# Patient Record
Sex: FEMALE | Race: WHITE | NOT HISPANIC OR LATINO | Employment: PART TIME | URBAN - METROPOLITAN AREA
[De-identification: names, ages, dates, MRNs, and addresses within clinical notes are randomized per-mention and may not be internally consistent; named-entity substitution may affect disease eponyms.]

---

## 2017-05-10 ENCOUNTER — ALLSCRIPTS OFFICE VISIT (OUTPATIENT)
Dept: OTHER | Facility: OTHER | Age: 22
End: 2017-05-10

## 2017-06-02 ENCOUNTER — ALLSCRIPTS OFFICE VISIT (OUTPATIENT)
Dept: OTHER | Facility: OTHER | Age: 22
End: 2017-06-02

## 2017-08-23 ENCOUNTER — ALLSCRIPTS OFFICE VISIT (OUTPATIENT)
Dept: OTHER | Facility: OTHER | Age: 22
End: 2017-08-23

## 2017-09-13 ENCOUNTER — GENERIC CONVERSION - ENCOUNTER (OUTPATIENT)
Dept: OTHER | Facility: OTHER | Age: 22
End: 2017-09-13

## 2017-10-19 ENCOUNTER — ALLSCRIPTS OFFICE VISIT (OUTPATIENT)
Dept: OTHER | Facility: OTHER | Age: 22
End: 2017-10-19

## 2017-10-19 DIAGNOSIS — S93.402A SPRAIN OF LIGAMENT OF LEFT ANKLE: ICD-10-CM
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
## 2017-10-20 NOTE — PROGRESS NOTES
Assessment  1  Sprain of left ankle, initial encounter (845 00) (S93 402A)   2  Injury caused by twisting with sudden strenuous movement, initial encounter (E927 3)   (X50 1XXA)    Plan  Injury caused by twisting with sudden strenuous movement, initial encounter, Sprain of  left ankle, initial encounter    · * XR ANKLE 3+ VIEW LEFT; Status:Active; Requested PJE:[id number];     Discussion/Summary    Discussed condition with patient  I suspect she has sustained an acute sprain of the left ankle  I recommended rest, elevation, ice, Tylenol/NSAIDs, and observation  I have provided her a prescription for an ankle x-ray should her condition not improve within the next few weeks and I will call results once obtained should she choose to go for this  The treatment plan was reviewed with the patient/guardian  The patient/guardian understands and agrees with the treatment plan      Chief Complaint  Pt c/o her L ankle being swollen and painful x 1 day  Pt states she works in [employer] and is on her feet a lot so her ankles get stiff in the morning but today was worse than usual       History of Present Illness  HPI: Patient presents with 1 day history of acute left medial foot and ankle pain  She believes she may have rolled her ankle and that that may very well be the cause of the pain  She denies any blunt force trauma  She has not really done anything for conservatively at this point including compression, ice, or any Tylenol/NSAIDs  She denies any other complaints today  Review of Systems    Constitutional: No fever, no chills, feels well, no tiredness, no recent weight gain or loss  Cardiovascular: no complaints of slow or fast heart rate, no chest pain, no palpitations, no leg claudication or lower extremity edema  Respiratory: no complaints of shortness of breath, no wheezing, no dyspnea on exertion, no orthopnea or PND     Gastrointestinal: no complaints of abdominal pain, no constipation, no nausea or diarrhea, no vomiting, no bloody stools  Genitourinary: no complaints of dysuria, no incontinence, no pelvic pain, no dysmenorrhea, no vaginal discharge or abnormal vaginal bleeding  Musculoskeletal: as noted in HPI  Active Problems  1  Asthma, mild intermittent (493 90) (J45 20)   2  Depression, unspecified depression type (311) (F32 9)   3  Dyshidrotic eczema (705 81) (L30 1)   4  Eczema (692 9) (L30 9)   5  Laceration of toe of right foot (893 0) (S91 119A)   6  Need for Tdap vaccination (V06 1) (Z23)   7  Paresthesia of right foot (782 0) (R20 2)   8  Positive depression screening (796 4) (Z13 89)   9  Screening for depression (V79 0) (Z13 89)   10  Walked into furniture, initial encounter (A553 6) (B35 03VX)    Past Medical History  1  History of Acute bacterial conjunctivitis of right eye (372 03) (H10 31)   2  History of Acute upper respiratory infection (465 9) (J06 9)   3  History of Cough (786 2) (R05)   4  History of Flu vaccine need (V04 81) (Z23)   5  History of acute bacterial sinusitis (V12 69) (Z87 09)   6  History of acute bronchitis (V12 69) (Z87 09)   7  History of acute sinusitis (V12 69) (Z87 09)   8  History of allergic rhinitis (V12 69) (Z87 09)   9  History of allergic rhinitis (V12 69) (Z87 09)   10  History of contact dermatitis (V13 3) (Z87 2)   11  History of fever (V13 89) (Z87 898)   12  History of otitis media (V12 49) (Z86 69)   13  History of Influenza A (487 1) (J10 1)   14  History of Puffy Eyelids (374 82)   15  History of Streptococcal sore throat (034 0) (J02 0)    Family History  Family History    1  Family history of Allergies   2  Family history of Eczema   3  Family history of Hypertension (V17 49)    Social History   · Denied: History of Alcohol Use (History)   · Current Some Day Smoker (305 1)   · Denied: History of Drug Use  The social history was reviewed and is unchanged  Current Meds   1   Eucrisa 2 % External Ointment; APPLY TO AFFECTED AREAS SPARINGLY TWICE   DAILY; Therapy: 26Yvm4808 to (Last Rx:13Sep2017) Ordered   2  Fluticasone Propionate 50 MCG/ACT Nasal Suspension; USE 2 SPRAYS IN EACH   NOSTRIL ONCE DAILY; Therapy: 82FZA0870 to (Last Dennis Parsons)  Requested for: 21Oct2016 Ordered   3  Montelukast Sodium 10 MG Oral Tablet; take 1 tablet by mouth daily as directed; Therapy: 40LNM2708 to (Evaluate:51Fms6629)  Requested for: 66Uhc9656; Last   Rx:21Ciz1626 Ordered   4  PredniSONE 10 MG Oral Tablet; 6 tabs PO daily x 2 days, then 5,5, 4, 4, 3, 3, 2, 2,   1, 1; Therapy: 28Bow9894 to (Evaluate:07Oct2017)  Requested for: 68Ylm5117; Last   Rx:89Kds5930 Ordered   5  ProAir  (90 Base) MCG/ACT Inhalation Aerosol Solution; INHALE 2 PUFFS   Every 6 hours PRN; Therapy: 70MZB2944 to (Last Rx:21Oct2016)  Requested for: 21Oct2016 Ordered   6  Triamcinolone Acetonide 0 1 % External Cream; APPLY 2-3 TIMES DAILY TO   AFFECTED AREA(S); Therapy: 77Dhs4012 to (Last Lazarus Merchant)  Requested for: 13Sep2017; Status:   ACTIVE - Renewal Voided Ordered    The medication list was reviewed and updated today  Allergies  1  No Known Drug Allergies    Vitals   Recorded: 41SPG8594 03:11PM   Temperature 97 7 F, Tympanic   Heart Rate 91   Respiration Quality Normal   Respiration 18   Systolic 551, LUE, Sitting   Diastolic 82, LUE, Sitting   Height 5 ft 6 5 in   Weight 174 lb 4 oz   BMI Calculated 27 7   BSA Calculated 1 9   O2 Saturation 98, RA   LMP 86Cfy0705   Pain Scale 6     Physical Exam    Constitutional   General appearance: No acute distress, well appearing and well nourished  Musculoskeletal   Inspection/palpation of joints, bones, and muscles: Abnormal  -- Tenderness to palpation and mild localized swelling over the left medial foot just below the malleolus and worsened with valgus stress on the ankle joint but range of motion otherwise intact without significant difficulty; no ecchymosis visualized  Neurologic   Sensation: No sensory loss      Psychiatric Orientation to person, place, and time: Normal     Mood and affect: Normal     Additional Exam:  Vital signs were reviewed  Signatures   Electronically signed by :  Mando Bush [facility]; Oct 19 2017  4:12PM EST                       (Author)    Electronically signed by : Girtha Hashimoto, DO; Oct 19 2017  5:30PM EST

## 2018-01-10 NOTE — MISCELLANEOUS
Message  Return to work or school:   Aditya Guzmán is under my professional care  She was seen in my office on 10/19/17       Out of Work 10/19/17  Marcy Hill PA-C        Signatures   Electronically signed by : Iman Curry, ; Oct 19 2017  3:39PM EST                       (Author)

## 2018-01-12 NOTE — PSYCH
History of Present Illness  Psychotherapy Provided [place]: Individual Psychotherapy 40 minutes provided today  Goals addressed in session:   Met with pt for an initial session  Discussed how pt has been struggling with anxiety and depression but that she is trying to work on getting herself on a "better track"  Pt states that she would like to develop some techniques that help her to better assess and respond to situations  Discussed the 5s question (will this affect me 5 days, 5 months, 5 years from now) to determine if it is something she can cope with or something she needs to react to  Pt will try this technique and will follow up with this worker to continue building on skills  HPI - Psych: Pt Is not on medication and is not sure that she wants to start any  Pt experiences anger, irritability, worrying, over analyzing, and difficulty letting things go  Pt works and goes to school full time  Pt wants to move up in the restaurant business but knows she needs to respond differently to others for that to happen  Pt has support in her family and friends  Pt was calm and cooperative throughout the session  Pt's mood and affect appeared to be within normal limits  Pt denies SI   Note   Note:   Encouraged the pt to try the 5s question and to follow up with this worker in a few weeks to continue to discuss her symptoms and skills  Assessment    1   Depression, unspecified depression type (311) (F32 9)    Signatures   Electronically signed by : Aleta Lopez LCSW; Jun 2 2017  2:44PM EST                       (Author)

## 2018-01-13 VITALS
OXYGEN SATURATION: 98 % | BODY MASS INDEX: 29.08 KG/M2 | WEIGHT: 170.31 LBS | HEIGHT: 64 IN | TEMPERATURE: 97.1 F | DIASTOLIC BLOOD PRESSURE: 80 MMHG | HEART RATE: 83 BPM | SYSTOLIC BLOOD PRESSURE: 110 MMHG | RESPIRATION RATE: 19 BRPM

## 2018-01-13 VITALS
OXYGEN SATURATION: 98 % | DIASTOLIC BLOOD PRESSURE: 80 MMHG | RESPIRATION RATE: 17 BRPM | BODY MASS INDEX: 28.24 KG/M2 | WEIGHT: 165.38 LBS | TEMPERATURE: 97.3 F | HEIGHT: 64 IN | SYSTOLIC BLOOD PRESSURE: 140 MMHG | HEART RATE: 92 BPM

## 2018-01-14 VITALS
SYSTOLIC BLOOD PRESSURE: 130 MMHG | DIASTOLIC BLOOD PRESSURE: 82 MMHG | TEMPERATURE: 97.7 F | HEART RATE: 91 BPM | RESPIRATION RATE: 18 BRPM | WEIGHT: 174.25 LBS | HEIGHT: 67 IN | BODY MASS INDEX: 27.35 KG/M2 | OXYGEN SATURATION: 98 %

## 2018-01-14 NOTE — PROGRESS NOTES
Assessment   1  Acute bacterial sinusitis (461 9) (J01 90)  2  Bronchospasm, acute (519 11) (J98 01)  3  Asthma, mild intermittent (493 90) (J45 20)  4  Allergic rhinitis (477 9) (J30 9)    Plan  Acute bacterial sinusitis, Allergic rhinitis, Asthma, mild intermittent, Cough    · 2 - Dale Hinton DO  (Otolaryngology) Physician Referral  Consult  Status: Active   Requested for: 20Jan2016  () Care Summary provided  : Yes    Discussion/Summary    Patient is here for asthma med check but also concerned about cold symptoms persisting for the past 3 weeks  She was seen at urgent care on 1/16/16 and diagnosed with acute bronchitis and otitis media and treated with cough med with codeine and Augmentin  Patient states she is feeling a little better but still with persistent cough and chest tightness with asthma exacerbation, ear pressure and nasal congestion/sinus pressure  I will have patient continue Augmentin course until completed, as she is only half way through, but will add a prednisone 10 day taper to help reduce inflammation and pressure in the chest and sinuses  She was given an albuterol nebulizer in the office today with slight improvement of wheezing and air exchange, but I do feel prednisone is necessary at this time  I will have her continue pro air as needed, continue Mucinex DM  Patient is concerned as she has seen an ENT in the past for frequent upper respiratory infections that exacerbate her asthma but states she never followed through  Referral given to new ENT  She denies weekly use of her rescue inhaler and asthma does not wake her up in the middle the night  She reports only needing her pro air when she gets upper respiratory infections and colds  Therefore, I don't feel she needs a daily maintenance inhaler  However, for allergies and asthma I do feel that she may benefit from trying a daily Singulair which I did prescribe for her today   I explained to her that this is for maintenance of her asthma and allergies and reduce exacerbations and hopefully reduce allergy triggered upper respiratory infections  Patient advised to complete recommendations for treatment and to call if any persistent issues  Otherwise, she will follow up with ENT as directed  Possible side effects of new medications were reviewed with the patient/guardian today  The treatment plan was reviewed with the patient/guardian  The patient/guardian understands and agrees with the treatment plan      Chief Complaint  Pt here for issues with asthma and would like a consult for an ENT  History of Present Illness  HPI: Patient is a 21y/o female here for asthma f/u  She states she continues having reoccurring cold sxs, getting worse before it gets better  She states it flares up her asthma  States used to see ENT and never followed up  Wants another referral  Pt states has been also dealing with cold sxs for about 3 weeks  Reports nasal congestion, ear pressure, sinus pressure, loose/harsh cough  Associated tightness in chest and SOB, cannot take a deep breath, causes her to cough  Using her rescue inhaler every 4-6 hours without benefit  No fevers  Was seen at urgent care 1/16/16 and dx with bronchitis and otitis media  Pt taking augmentin, rescue inhaler, mucinex, claritin  Pt states she generally only needs rescue inhaler when she has cold sxs, allergies and when weather changes  Does not generally wake her up at night  Review of Systems    Constitutional: as noted in HPI    ENT: as noted in HPI  Cardiovascular: no complaints of slow or fast heart rate, no chest pain, no palpitations, no leg claudication or lower extremity edema  Respiratory: as noted in HPI  Active Problems   1  Acute bacterial conjunctivitis of right eye (372 03) (H10 021)  2  Bronchospasm, acute (519 11) (J98 01)  3  Cough (786 2) (R05)  4  Influenza A (487 1) (J10 1)    Past Medical History   1   History of Acute upper respiratory infection (465 9) (J06 9)  2  History of acute sinusitis (V12 69) (Z87 09)  3  History of allergic rhinitis (V12 69) (Z87 09)  4  History of contact dermatitis (V13 3) (Z87 2)  5  History of eczema (V13 3) (Z87 2)  6  History of fever (V13 89) (Z87 898)  7  History of otitis media (V12 49) (Z86 69)  8  History of Puffy Eyelids (374 82)  9  History of Streptococcal sore throat (034 0) (J02 0)    Family History   1  Family history of Allergies  2  Family history of Eczema  3  Family history of Hypertension (V17 49)    Social History    · Denied: History of Alcohol Use (History)   · Current Some Day Smoker (305 1)   · Denied: History of Drug Use  The social history was reviewed and updated today  Current Meds  1  Fluticasone Propionate 50 MCG/ACT Nasal Suspension; USE 1 SPRAY IN EACH   NOSTRIL ONCE DAILY; Therapy: 41YLS6771 to (Evaluate:17Nov2014)  Requested for: 76DGR0545; Last   Rx:15Rde3604 Ordered  2  ProAir  (90 Base) MCG/ACT Inhalation Aerosol Solution; INHALE 2 PUFFS   Every 6 hours PRN; Therapy: 13ENQ4030 to (Last Rx:20Ujh7265)  Requested for: 84Zvq3210 Ordered  3  Tobramycin 0 3 % Ophthalmic Solution; INSTILL 1 DROP IN THE AFFECTED EYE(S)   EVERY 2 HOURS WHILE AWAKE FOR 2 DAYS, THEN 1 DROP 4 TIMES DAILY; Therapy: 25WIU3856 to (Last Rx:31Uel9975) Ordered    The medication list was reviewed and updated today  Allergies   1  No Known Drug Allergies    Physical Exam    Constitutional   General appearance: Abnormal   acutely ill, comfortable, within normal limits of ideal weight, appears tired and appearance reflects stated age  Eyes   Conjunctiva and lids: No swelling, erythema or discharge  Ears, Nose, Mouth, and Throat   External inspection of ears and nose: Normal     Otoscopic examination: Abnormal   B/L TM's dull and bulging, erythematous, clear effusions  Nasal mucosa, septum, and turbinates: Abnormal   There was a mucoid discharge from both nares   The bilateral nasal mucosa was boggy, edematous and red  B/L swelling and erythema  Oropharynx: Abnormal   The posterior pharynx was erythematous and excessive thicker PND, but did not have an exudate  There was 3+ enlargement, but no erythema, no swelling and no concretions of both tonsils no exudate  Pulmonary   Respiratory effort: Abnormal   Respiratory rate: normal  Assessment of respiratory effort revealed normal rhythm and effort  Respiratory Findings: wet cough and excessive wet coughing throughout appt  Auscultation of lungs: Abnormal   B/L posterior lung fields with moderate decreased BS throughout, coarse and exp  wheezing  Cardiovascular   Auscultation of heart: Normal rate and rhythm, normal S1 and S2, without murmurs  Skin   Skin and subcutaneous tissue: Abnormal          Procedure      Treatment #1 included Albuterol Sulfate 2 5 mg  After treatment #1, the patient noted symptomatic improvement  Air exchange was improved  Wheezes were heard diffusely                    Signatures   Electronically signed by : CRISTINA Cantrell; Jan 20 2016  4:27PM EST                       (Author)    Electronically signed by : Alee Tolbert DO; Jan 21 2016  9:10PM EST                       (Co-author)

## 2018-01-22 VITALS
DIASTOLIC BLOOD PRESSURE: 80 MMHG | HEIGHT: 66 IN | OXYGEN SATURATION: 98 % | RESPIRATION RATE: 22 BRPM | BODY MASS INDEX: 27.36 KG/M2 | HEART RATE: 72 BPM | WEIGHT: 170.25 LBS | SYSTOLIC BLOOD PRESSURE: 114 MMHG | TEMPERATURE: 97.6 F

## 2018-03-30 DIAGNOSIS — J30.9 ALLERGIC RHINITIS, UNSPECIFIED CHRONICITY, UNSPECIFIED SEASONALITY, UNSPECIFIED TRIGGER: Primary | ICD-10-CM

## 2018-03-30 DIAGNOSIS — J45.20 MILD INTERMITTENT ASTHMA WITHOUT COMPLICATION: ICD-10-CM

## 2018-03-30 PROBLEM — F32.A DEPRESSION: Status: ACTIVE | Noted: 2017-06-02

## 2018-03-30 PROBLEM — L30.1 DYSHIDROTIC ECZEMA: Status: ACTIVE | Noted: 2017-08-23

## 2018-03-30 RX ORDER — FLUTICASONE PROPIONATE 50 MCG
SPRAY, SUSPENSION (ML) NASAL
Qty: 1 BOTTLE | Refills: 0 | Status: SHIPPED | OUTPATIENT
Start: 2018-03-30 | End: 2019-09-12 | Stop reason: SDUPTHER

## 2018-09-11 ENCOUNTER — OFFICE VISIT (OUTPATIENT)
Dept: FAMILY MEDICINE CLINIC | Facility: CLINIC | Age: 23
End: 2018-09-11
Payer: COMMERCIAL

## 2018-09-11 VITALS
SYSTOLIC BLOOD PRESSURE: 106 MMHG | RESPIRATION RATE: 15 BRPM | HEART RATE: 90 BPM | DIASTOLIC BLOOD PRESSURE: 80 MMHG | WEIGHT: 174.3 LBS | OXYGEN SATURATION: 98 % | TEMPERATURE: 98.7 F | BODY MASS INDEX: 32.07 KG/M2 | HEIGHT: 62 IN

## 2018-09-11 DIAGNOSIS — J40 BRONCHITIS: Primary | ICD-10-CM

## 2018-09-11 DIAGNOSIS — J35.1 HYPERTROPHY OF TONSILS: ICD-10-CM

## 2018-09-11 DIAGNOSIS — J45.20 MILD INTERMITTENT ASTHMA WITHOUT COMPLICATION: ICD-10-CM

## 2018-09-11 PROCEDURE — 99214 OFFICE O/P EST MOD 30 MIN: CPT | Performed by: PHYSICIAN ASSISTANT

## 2018-09-11 RX ORDER — AZITHROMYCIN 250 MG/1
TABLET, FILM COATED ORAL
Qty: 6 TABLET | Refills: 0 | Status: SHIPPED | OUTPATIENT
Start: 2018-09-11 | End: 2018-09-16

## 2018-09-11 RX ORDER — MONTELUKAST SODIUM 10 MG/1
10 TABLET ORAL
Qty: 30 TABLET | Refills: 5 | Status: SHIPPED | OUTPATIENT
Start: 2018-09-11 | End: 2018-11-01 | Stop reason: SDUPTHER

## 2018-09-11 RX ORDER — ALBUTEROL SULFATE 90 UG/1
2 AEROSOL, METERED RESPIRATORY (INHALATION) EVERY 6 HOURS PRN
Qty: 8.5 G | Refills: 2 | Status: SHIPPED | OUTPATIENT
Start: 2018-09-11 | End: 2019-01-21 | Stop reason: SDUPTHER

## 2018-09-11 RX ORDER — TRIAMCINOLONE ACETONIDE 1 MG/G
CREAM TOPICAL
Refills: 3 | COMMUNITY
Start: 2018-06-21 | End: 2018-11-01 | Stop reason: SDUPTHER

## 2018-09-11 RX ORDER — MONTELUKAST SODIUM 10 MG/1
TABLET ORAL
Refills: 5 | COMMUNITY
Start: 2018-06-21 | End: 2018-09-11 | Stop reason: SDUPTHER

## 2018-09-11 NOTE — LETTER
September 11, 2018     Patient: Miller Tate   YOB: 1995   Date of Visit: 9/11/2018       To Whom it May Concern:    Miller Tate is under my professional care  She was seen in my office on 9/11/2018  She may return to work on 9/12/18  If you have any questions or concerns, please don't hesitate to call           Sincerely,          Harriet Green PA-C        CC: No Recipients

## 2018-11-01 ENCOUNTER — OFFICE VISIT (OUTPATIENT)
Dept: FAMILY MEDICINE CLINIC | Facility: CLINIC | Age: 23
End: 2018-11-01
Payer: COMMERCIAL

## 2018-11-01 VITALS
HEIGHT: 62 IN | TEMPERATURE: 99.1 F | HEART RATE: 91 BPM | DIASTOLIC BLOOD PRESSURE: 70 MMHG | SYSTOLIC BLOOD PRESSURE: 110 MMHG | RESPIRATION RATE: 16 BRPM | BODY MASS INDEX: 33.31 KG/M2 | WEIGHT: 181 LBS | OXYGEN SATURATION: 97 %

## 2018-11-01 DIAGNOSIS — J45.20 MILD INTERMITTENT ASTHMA WITHOUT COMPLICATION: ICD-10-CM

## 2018-11-01 DIAGNOSIS — L30.9 ECZEMA, UNSPECIFIED TYPE: ICD-10-CM

## 2018-11-01 DIAGNOSIS — L30.9 DERMATITIS: Primary | ICD-10-CM

## 2018-11-01 PROCEDURE — 3008F BODY MASS INDEX DOCD: CPT | Performed by: PHYSICIAN ASSISTANT

## 2018-11-01 PROCEDURE — 96372 THER/PROPH/DIAG INJ SC/IM: CPT | Performed by: PHYSICIAN ASSISTANT

## 2018-11-01 PROCEDURE — 99213 OFFICE O/P EST LOW 20 MIN: CPT | Performed by: PHYSICIAN ASSISTANT

## 2018-11-01 RX ORDER — PREDNISONE 10 MG/1
TABLET ORAL
Qty: 32 TABLET | Refills: 0 | Status: SHIPPED | OUTPATIENT
Start: 2018-11-01 | End: 2019-01-21

## 2018-11-01 RX ORDER — TRIAMCINOLONE ACETONIDE 40 MG/ML
60 INJECTION, SUSPENSION INTRA-ARTICULAR; INTRAMUSCULAR ONCE
Status: COMPLETED | OUTPATIENT
Start: 2018-11-01 | End: 2018-11-01

## 2018-11-01 RX ORDER — MONTELUKAST SODIUM 10 MG/1
10 TABLET ORAL
Qty: 30 TABLET | Refills: 6 | Status: SHIPPED | OUTPATIENT
Start: 2018-11-01 | End: 2019-01-21 | Stop reason: SDUPTHER

## 2018-11-01 RX ORDER — TRIAMCINOLONE ACETONIDE 1 MG/G
CREAM TOPICAL 2 TIMES DAILY
Qty: 80 G | Refills: 3 | Status: SHIPPED | OUTPATIENT
Start: 2018-11-01 | End: 2019-03-07 | Stop reason: SDUPTHER

## 2018-11-01 RX ADMIN — TRIAMCINOLONE ACETONIDE 60 MG: 40 INJECTION, SUSPENSION INTRA-ARTICULAR; INTRAMUSCULAR at 17:35

## 2018-11-01 NOTE — PROGRESS NOTES
Assessment/Plan:      Diagnoses and all orders for this visit:    Dermatitis  -     predniSONE 10 mg tablet; 6 tabs PO x 2 days, then 5 tabs PO x 2 days, 4 tab x 1, then 3,2,1  -     triamcinolone acetonide (KENALOG-40) 40 mg/mL injection 60 mg; Inject 1 5 mL (60 mg total) into a muscle once     Mild intermittent asthma without complication  -     montelukast (SINGULAIR) 10 mg tablet; Take 1 tablet (10 mg total) by mouth daily at bedtime    Eczema, unspecified type  -     triamcinolone (KENALOG) 0 1 % cream; Apply topically 2 (two) times a day  -     predniSONE 10 mg tablet; 6 tabs PO x 2 days, then 5 tabs PO x 2 days, 4 tab x 1, then 3,2,1  -     triamcinolone acetonide (KENALOG-40) 40 mg/mL injection 60 mg; Inject 1 5 mL (60 mg total) into a muscle once        Patient is a 78-year-old female presenting today for concern of rash on her face, upper chest and neck as well as her arms consistent with a nonspecific dermatitis but also suspecting an eczema flare  She recently traveled   To [place] but states that the rash was slightly present prior to traveling but has worsened since she has gotten home  Today we will administer 60 mg of Kenalog intramuscularly  She will start a prednisone taper as directed tomorrow morning taking it every morning with food with risks versus benefits and side effects discussed  Her asthma is well controlled with Singulair refilled today  Her topical triamcinolone steroid cream was also refilled that she can use on itchy areas avoiding her face  We will see how she does through the course of the next few days and she is to call or follow up with persistent symptoms  Chief Complaint   Patient presents with    Rash     neck,face,arms x 1 week       Subjective:     Patient ID: Cruzito Nguyen is a 24 y o  female  24y/o female here today for a week of rash mostly UE's chest, hands and face  Slightly itchy, eyes blood shot   Rash "hurts and uncomfortable" Took 2 benadryl without relief  She just got back from [place], started while traveling there but was fine and itchy slightly while there but worse past few days  Review of Systems   Constitutional: Negative  Respiratory: Negative  Skin:        As in HPI         The following portions of the patient's history were reviewed and updated as appropriate: allergies, current medications, past family history, past medical history, past social history, past surgical history and problem list       Objective:     Physical Exam   Constitutional: She appears well-developed and well-nourished  Skin:   Erythematous rash with scattered tiny papules within upper chest and neck, face, slight swelling of eyelids  B/L palms of hands and some fingers with blister type papules under skin consistent with  Dyshidrotic eczema  Also dryness and excoriation and erythema B/L antecubital regions of B/L arms  Psychiatric: She has a normal mood and affect  Vitals reviewed        Vitals:    11/01/18 1702   BP: 110/70   BP Location: Left arm   Patient Position: Sitting   Cuff Size: Adult   Pulse: 91   Resp: 16   Temp: 99 1 °F (37 3 °C)   TempSrc: Tympanic   SpO2: 97%   Weight: 82 1 kg (181 lb)   Height: 5' 2 21" (1 58 m)

## 2018-11-01 NOTE — LETTER
November 1, 2018     Patient: Chuck Elizondo   YOB: 1995   Date of Visit: 11/1/2018       To Whom it May Concern:    Chuck Elizondo is under my professional care  She was seen in my office on 11/1/2018  She may return to work on 11/2/18       If you have any questions or concerns, please don't hesitate to call           Sincerely,          Magalie Asencio PA-C        CC: No Recipients

## 2019-01-21 ENCOUNTER — OFFICE VISIT (OUTPATIENT)
Dept: FAMILY MEDICINE CLINIC | Facility: CLINIC | Age: 24
End: 2019-01-21
Payer: COMMERCIAL

## 2019-01-21 VITALS
RESPIRATION RATE: 17 BRPM | HEIGHT: 62 IN | SYSTOLIC BLOOD PRESSURE: 118 MMHG | HEART RATE: 87 BPM | TEMPERATURE: 99.2 F | BODY MASS INDEX: 35.48 KG/M2 | OXYGEN SATURATION: 96 % | WEIGHT: 192.8 LBS | DIASTOLIC BLOOD PRESSURE: 84 MMHG

## 2019-01-21 DIAGNOSIS — J45.21 MILD INTERMITTENT ASTHMA WITH ACUTE EXACERBATION: Primary | ICD-10-CM

## 2019-01-21 DIAGNOSIS — J01.90 ACUTE NON-RECURRENT SINUSITIS, UNSPECIFIED LOCATION: ICD-10-CM

## 2019-01-21 DIAGNOSIS — J45.20 MILD INTERMITTENT ASTHMA WITHOUT COMPLICATION: ICD-10-CM

## 2019-01-21 PROCEDURE — 99214 OFFICE O/P EST MOD 30 MIN: CPT | Performed by: PHYSICIAN ASSISTANT

## 2019-01-21 PROCEDURE — 94640 AIRWAY INHALATION TREATMENT: CPT | Performed by: PHYSICIAN ASSISTANT

## 2019-01-21 RX ORDER — ALBUTEROL SULFATE 90 UG/1
2 AEROSOL, METERED RESPIRATORY (INHALATION) EVERY 6 HOURS PRN
Qty: 8.5 G | Refills: 2 | Status: SHIPPED | OUTPATIENT
Start: 2019-01-21 | End: 2019-10-26 | Stop reason: SDUPTHER

## 2019-01-21 RX ORDER — PREDNISONE 10 MG/1
TABLET ORAL
Qty: 21 TABLET | Refills: 0 | Status: SHIPPED | OUTPATIENT
Start: 2019-01-21 | End: 2019-03-07 | Stop reason: SDUPTHER

## 2019-01-21 RX ORDER — ALBUTEROL SULFATE 2.5 MG/3ML
2.5 SOLUTION RESPIRATORY (INHALATION) ONCE
Status: COMPLETED | OUTPATIENT
Start: 2019-01-21 | End: 2019-01-21

## 2019-01-21 RX ORDER — AZITHROMYCIN 250 MG/1
TABLET, FILM COATED ORAL
Qty: 6 TABLET | Refills: 0 | Status: SHIPPED | OUTPATIENT
Start: 2019-01-21 | End: 2019-01-25

## 2019-01-21 RX ORDER — MONTELUKAST SODIUM 10 MG/1
10 TABLET ORAL
Qty: 30 TABLET | Refills: 5 | Status: SHIPPED | OUTPATIENT
Start: 2019-01-21 | End: 2019-03-07 | Stop reason: SDUPTHER

## 2019-01-21 RX ADMIN — ALBUTEROL SULFATE 2.5 MG: 2.5 SOLUTION RESPIRATORY (INHALATION) at 19:08

## 2019-01-21 NOTE — PROGRESS NOTES
Assessment/Plan:      Diagnoses and all orders for this visit:    Mild intermittent asthma with acute exacerbation  -     predniSONE 10 mg tablet; 6,5,4,3,2,1  Take with food  -     albuterol inhalation solution 2 5 mg; Take 3 mL (2 5 mg total) by nebulization once   -     Mini neb    Acute non-recurrent sinusitis, unspecified location  -     azithromycin (ZITHROMAX) 250 mg tablet; 2 tabs PO daily x 1 day, then 1 tab Po daily x 4 days  -     predniSONE 10 mg tablet; 6,5,4,3,2,1  Take with food    Mild intermittent asthma without complication  -     montelukast (SINGULAIR) 10 mg tablet; Take 1 tablet (10 mg total) by mouth daily at bedtime  -     albuterol (PROAIR HFA) 90 mcg/act inhaler; Inhale 2 puffs every 6 (six) hours as needed for wheezing or shortness of breath      34-year-old female presenting today for persistent respiratory symptoms consistent with sinusitis and exacerbation of her asthma  I will treat her with a course of azithromycin as well as a 6 day taper prednisone with potential side effects discussed  A nebulizer treatment was administered in the office today for symptoms with subjective improvement as well as some improvement coarse breath sounds and LEs and wheezing  She will continue her ProAir and her Singulair as directed both were refilled today  She can continue using Mucinex OTC also to help with symptoms  We will see how she responds over the next few days and was advised to call or follow up with any persistent or worsening symptoms  Rest and fluids were advised  Chief Complaint   Patient presents with    Cough     x3d    Chest Congestion     x3d       Subjective:     Patient ID: Everton Galdamez is a 24 y o  female  22y/o female here today for persistent sinus sxs x 2-3 weeks, as well as asthma acting up  Nasal congestion/pressure, dry cough, chest tightness, SOB, wheezing  Low grade temp  No sore throat or ear pain  Using rescue more frequently, gets temporary relief   Drinking tea and soup  She needs refill of her singulair  Tried dayquil and nyquil, mucinex  Review of Systems   Constitutional: Positive for fever  HENT: Positive for congestion  Negative for ear pain and sore throat  Respiratory: Positive for cough, chest tightness, shortness of breath and wheezing  Cardiovascular: Negative  Psychiatric/Behavioral: Negative  The following portions of the patient's history were reviewed and updated as appropriate: allergies, current medications, past family history, past medical history, past social history, past surgical history and problem list       Objective:     Physical Exam   Constitutional: She appears well-developed  No distress  Appearing fatigued, mildly ill, obesity BMI 35   HENT:   Head: Normocephalic  Right Ear: Tympanic membrane and ear canal normal    Left Ear: Tympanic membrane and ear canal normal    Nose: Mucosal edema present  No rhinorrhea  Mouth/Throat: Posterior oropharyngeal erythema (PND) present  No posterior oropharyngeal edema  Neck: Neck supple  Cardiovascular: Normal rate, regular rhythm and normal heart sounds  Vitals reviewed  Vitals:    01/21/19 1817   BP: 118/84   BP Location: Left arm   Patient Position: Sitting   Cuff Size: Standard   Pulse: 87   Resp: 17   Temp: 99 2 °F (37 3 °C)   TempSrc: Tympanic   SpO2: 96%   Weight: 87 5 kg (192 lb 12 8 oz)   Height: 5' 2 21" (1 58 m)     Mini neb  Performed by: Qiun Johns  Authorized by: Quin Johns     Number of treatments:  1  Treatment 1:   Pre-Procedure     Symptoms:  Wheezing, shortness of breath and cough    Lung Sounds: Insp and exp wheezing throughout, coarse BS    HR:  87    RR:  17    SP02:  96% on RA    Medication Administered:  Albuterol 2 5 mg  Post-Procedure     Post-treatment symptoms: verbal improvement with sxs  Lung sounds:  Exp wheezing   less coarse throughout    HR:  Not checked    RR:  Not checked    SP02:  Not checked

## 2019-03-07 ENCOUNTER — OFFICE VISIT (OUTPATIENT)
Dept: FAMILY MEDICINE CLINIC | Facility: CLINIC | Age: 24
End: 2019-03-07
Payer: COMMERCIAL

## 2019-03-07 VITALS
BODY MASS INDEX: 35.17 KG/M2 | DIASTOLIC BLOOD PRESSURE: 80 MMHG | WEIGHT: 198.5 LBS | HEART RATE: 97 BPM | HEIGHT: 63 IN | OXYGEN SATURATION: 98 % | TEMPERATURE: 98.7 F | SYSTOLIC BLOOD PRESSURE: 122 MMHG | RESPIRATION RATE: 16 BRPM

## 2019-03-07 DIAGNOSIS — J45.20 MILD INTERMITTENT ASTHMA WITHOUT COMPLICATION: ICD-10-CM

## 2019-03-07 DIAGNOSIS — L30.9 ECZEMA, UNSPECIFIED TYPE: ICD-10-CM

## 2019-03-07 PROCEDURE — 99213 OFFICE O/P EST LOW 20 MIN: CPT | Performed by: PHYSICIAN ASSISTANT

## 2019-03-07 PROCEDURE — 1036F TOBACCO NON-USER: CPT | Performed by: PHYSICIAN ASSISTANT

## 2019-03-07 PROCEDURE — 3008F BODY MASS INDEX DOCD: CPT | Performed by: PHYSICIAN ASSISTANT

## 2019-03-07 RX ORDER — TRIAMCINOLONE ACETONIDE 1 MG/G
CREAM TOPICAL 2 TIMES DAILY
Qty: 80 G | Refills: 3 | Status: SHIPPED | OUTPATIENT
Start: 2019-03-07 | End: 2020-01-17

## 2019-03-07 RX ORDER — MONTELUKAST SODIUM 10 MG/1
10 TABLET ORAL
Qty: 30 TABLET | Refills: 5 | Status: SHIPPED | OUTPATIENT
Start: 2019-03-07 | End: 2020-03-13

## 2019-03-07 RX ORDER — PREDNISONE 10 MG/1
TABLET ORAL
Qty: 32 TABLET | Refills: 0 | Status: SHIPPED | OUTPATIENT
Start: 2019-03-07 | End: 2020-01-21 | Stop reason: ALTCHOICE

## 2019-03-07 RX ORDER — PREDNISONE 10 MG/1
TABLET ORAL
Qty: 32 TABLET | Refills: 0 | Status: SHIPPED | OUTPATIENT
Start: 2019-03-07 | End: 2019-03-07 | Stop reason: SDUPTHER

## 2019-09-12 DIAGNOSIS — J30.9 ALLERGIC RHINITIS: ICD-10-CM

## 2019-09-12 RX ORDER — FLUTICASONE PROPIONATE 50 MCG
SPRAY, SUSPENSION (ML) NASAL
Qty: 16 ML | Refills: 0 | Status: SHIPPED | OUTPATIENT
Start: 2019-09-12 | End: 2019-10-26 | Stop reason: SDUPTHER

## 2019-10-26 DIAGNOSIS — J30.9 ALLERGIC RHINITIS: ICD-10-CM

## 2019-10-26 DIAGNOSIS — J45.20 MILD INTERMITTENT ASTHMA WITHOUT COMPLICATION: ICD-10-CM

## 2019-10-27 RX ORDER — FLUTICASONE PROPIONATE 50 MCG
SPRAY, SUSPENSION (ML) NASAL
Qty: 16 ML | Refills: 0 | Status: SHIPPED | OUTPATIENT
Start: 2019-10-27 | End: 2020-03-13

## 2019-10-29 RX ORDER — ALBUTEROL SULFATE 90 UG/1
AEROSOL, METERED RESPIRATORY (INHALATION)
Qty: 8.5 G | Refills: 0 | Status: SHIPPED | OUTPATIENT
Start: 2019-10-29 | End: 2020-01-17

## 2019-10-30 NOTE — TELEPHONE ENCOUNTER
S/w pt gave her detailed message  Pt stated she will call back to schedule once she looks at her schedule  Thank you!

## 2020-01-17 DIAGNOSIS — L30.9 ECZEMA, UNSPECIFIED TYPE: ICD-10-CM

## 2020-01-17 DIAGNOSIS — J45.20 MILD INTERMITTENT ASTHMA WITHOUT COMPLICATION: ICD-10-CM

## 2020-01-17 RX ORDER — TRIAMCINOLONE ACETONIDE 1 MG/G
CREAM TOPICAL
Qty: 80 G | Refills: 0 | Status: SHIPPED | OUTPATIENT
Start: 2020-01-17 | End: 2020-03-13

## 2020-01-17 RX ORDER — ALBUTEROL SULFATE 90 UG/1
AEROSOL, METERED RESPIRATORY (INHALATION)
Qty: 8.5 G | Refills: 0 | Status: SHIPPED | OUTPATIENT
Start: 2020-01-17 | End: 2020-03-13

## 2020-01-21 ENCOUNTER — OFFICE VISIT (OUTPATIENT)
Dept: FAMILY MEDICINE CLINIC | Facility: CLINIC | Age: 25
End: 2020-01-21
Payer: COMMERCIAL

## 2020-01-21 VITALS
HEART RATE: 100 BPM | OXYGEN SATURATION: 98 % | BODY MASS INDEX: 33.77 KG/M2 | WEIGHT: 190.6 LBS | RESPIRATION RATE: 16 BRPM | TEMPERATURE: 98.1 F | SYSTOLIC BLOOD PRESSURE: 112 MMHG | DIASTOLIC BLOOD PRESSURE: 68 MMHG | HEIGHT: 63 IN

## 2020-01-21 DIAGNOSIS — J45.21 MILD INTERMITTENT ASTHMA WITH ACUTE EXACERBATION: Primary | ICD-10-CM

## 2020-01-21 DIAGNOSIS — L30.1 DYSHIDROTIC ECZEMA: ICD-10-CM

## 2020-01-21 DIAGNOSIS — R10.9 ABDOMINAL CRAMPING: ICD-10-CM

## 2020-01-21 PROCEDURE — 99213 OFFICE O/P EST LOW 20 MIN: CPT | Performed by: FAMILY MEDICINE

## 2020-01-21 PROCEDURE — 3008F BODY MASS INDEX DOCD: CPT | Performed by: FAMILY MEDICINE

## 2020-01-21 PROCEDURE — 1036F TOBACCO NON-USER: CPT | Performed by: FAMILY MEDICINE

## 2020-01-21 NOTE — PROGRESS NOTES
Assessment/Plan:   1  Mild intermittent asthma with acute exacerbation  Patient's symptoms appear very well controlled today  She denies any recent exacerbations of her asthma  Will continue at this time with her current treatment of Singulair as well as her Ventolin p r n  2  Dyshidrotic eczema  Stable today  Patient states that her eczema exacerbation from last week has been subsiding  She has been using her triamcinolone  She may continue to uses p r n  For symptom relief  3  Abdominal cramping  Unclear as to the exact cause of her abdominal cramping  Reviewed the differential with her  Symptoms may likely be secondary to a dietary trigger her allergy such as celiac verses lactose intolerance  She was advised to trial dietary avoidance  If symptoms are not improving, will consider further evaluation with Gastroenterology  Follow up with patient in 6 months to 1 year  BMI Counseling: Body mass index is 34 31 kg/m²  The BMI is above normal  Nutrition recommendations include decreasing portion sizes, encouraging healthy choices of fruits and vegetables, decreasing fast food intake, consuming healthier snacks and limiting drinks that contain sugar  Exercise recommendations include moderate physical activity 150 minutes/week and exercising 3-5 times per week  No pharmacotherapy was ordered  Diagnoses and all orders for this visit:    Mild intermittent asthma with acute exacerbation    Dyshidrotic eczema          Subjective:       Chief Complaint   Patient presents with    Follow-up     med check       Patient ID: Simon Jc is a 21 y o  female  Patient is a 19-year-old female presents today for follow-up on chronic conditions  She has mild intermittent asthma as well as dyshidrotic eczema  She has been taking her medications regularly  She denies any recent exacerbations of her asthma  She states that last week she did have an exacerbation of her eczema    She has been using her triamcinolone cream which has been effective for her  Patient states that she has been having persistent problems with her abdominal cramping  She states that she believes that she may likely have an intolerance  She has tried a gluten free diet which has been mildly effective  Review of Systems   Constitutional: Negative for activity change, chills, fatigue and fever  HENT: Negative for congestion, ear pain, sinus pressure and sore throat  Eyes: Negative for redness, itching and visual disturbance  Respiratory: Negative for cough and shortness of breath  Cardiovascular: Negative for chest pain and palpitations  Gastrointestinal: Negative for abdominal pain, diarrhea and nausea  Endocrine: Negative for cold intolerance and heat intolerance  Genitourinary: Negative for dysuria, flank pain and frequency  Musculoskeletal: Negative for arthralgias, back pain, gait problem and myalgias  Skin: Negative for color change  Allergic/Immunologic: Negative for environmental allergies  Neurological: Negative for dizziness, numbness and headaches  Psychiatric/Behavioral: Negative for behavioral problems and sleep disturbance  The following portions of the patient's history were reviewed and updated as appropriate : past family history, past medical history, past social history and past surgical history      Current Outpatient Medications:     albuterol (PROVENTIL HFA,VENTOLIN HFA) 90 mcg/act inhaler, INHALE 2 PUFFS BY MOUTH EVERY 6 HOURS AS NEEDED FOR WHEEZING OR SHORTNESS OF BREATH, Disp: 8 5 g, Rfl: 0    fluticasone (FLONASE) 50 mcg/act nasal spray, SHAKE LIQUID AND USE 2 SPRAYS IN EACH NOSTRIL EVERY DAY, Disp: 16 mL, Rfl: 0    montelukast (SINGULAIR) 10 mg tablet, Take 1 tablet (10 mg total) by mouth daily at bedtime, Disp: 30 tablet, Rfl: 5    triamcinolone (KENALOG) 0 1 % cream, APPLY EXTERNALLY TO THE AFFECTED AREA TWICE DAILY, Disp: 80 g, Rfl: 0         Objective:         Vitals: 01/21/20 1136   BP: 112/68   BP Location: Right arm   Patient Position: Sitting   Cuff Size: Adult   Pulse: 100   Resp: 16   Temp: 98 1 °F (36 7 °C)   TempSrc: Tympanic   SpO2: 98%   Weight: 86 5 kg (190 lb 9 6 oz)   Height: 5' 2 5" (1 588 m)     Physical Exam   Constitutional: She is oriented to person, place, and time  She appears well-developed and well-nourished  HENT:   Head: Normocephalic and atraumatic  Nose: Nose normal    Mouth/Throat: No oropharyngeal exudate  Eyes: Pupils are equal, round, and reactive to light  Right eye exhibits no discharge  Left eye exhibits no discharge  Neck: Normal range of motion  Neck supple  No tracheal deviation present  Cardiovascular: Normal rate, regular rhythm and intact distal pulses  Exam reveals no gallop and no friction rub  No murmur heard  Pulses:       Dorsalis pedis pulses are 2+ on the right side, and 2+ on the left side  Posterior tibial pulses are 2+ on the right side, and 2+ on the left side  Pulmonary/Chest: Effort normal and breath sounds normal  No respiratory distress  She has no wheezes  She has no rales  Abdominal: Soft  Bowel sounds are normal  She exhibits no distension  There is no tenderness  There is no rebound and no guarding  Musculoskeletal: Normal range of motion  She exhibits no edema  Lymphadenopathy:        Head (right side): No submental and no submandibular adenopathy present  Head (left side): No submental and no submandibular adenopathy present  She has no cervical adenopathy  Right cervical: No superficial cervical, no deep cervical and no posterior cervical adenopathy present  Left cervical: No superficial cervical, no deep cervical and no posterior cervical adenopathy present  Neurological: She is alert and oriented to person, place, and time  No cranial nerve deficit or sensory deficit  Skin: Skin is warm, dry and intact     Psychiatric: Her speech is normal and behavior is normal  Judgment normal  Her mood appears not anxious  Cognition and memory are normal  She does not exhibit a depressed mood  Vitals reviewed

## 2020-03-13 DIAGNOSIS — J45.20 MILD INTERMITTENT ASTHMA WITHOUT COMPLICATION: ICD-10-CM

## 2020-03-13 DIAGNOSIS — L30.9 ECZEMA, UNSPECIFIED TYPE: ICD-10-CM

## 2020-03-13 DIAGNOSIS — J30.9 ALLERGIC RHINITIS: ICD-10-CM

## 2020-03-13 RX ORDER — TRIAMCINOLONE ACETONIDE 1 MG/G
CREAM TOPICAL
Qty: 80 G | Refills: 0 | Status: SHIPPED | OUTPATIENT
Start: 2020-03-13 | End: 2020-08-05 | Stop reason: SDUPTHER

## 2020-03-13 RX ORDER — MONTELUKAST SODIUM 10 MG/1
TABLET ORAL
Qty: 90 TABLET | Refills: 1 | Status: SHIPPED | OUTPATIENT
Start: 2020-03-13 | End: 2020-03-15

## 2020-03-13 RX ORDER — ALBUTEROL SULFATE 90 UG/1
AEROSOL, METERED RESPIRATORY (INHALATION)
Qty: 8.5 G | Refills: 0 | Status: SHIPPED | OUTPATIENT
Start: 2020-03-13 | End: 2020-04-21 | Stop reason: SDUPTHER

## 2020-03-13 RX ORDER — FLUTICASONE PROPIONATE 50 MCG
SPRAY, SUSPENSION (ML) NASAL
Qty: 16 G | Refills: 5 | Status: SHIPPED | OUTPATIENT
Start: 2020-03-13 | End: 2020-04-21 | Stop reason: SDUPTHER

## 2020-03-15 DIAGNOSIS — J45.20 MILD INTERMITTENT ASTHMA WITHOUT COMPLICATION: ICD-10-CM

## 2020-03-15 RX ORDER — MONTELUKAST SODIUM 10 MG/1
TABLET ORAL
Qty: 30 TABLET | Refills: 5 | Status: SHIPPED | OUTPATIENT
Start: 2020-03-15 | End: 2020-04-21 | Stop reason: SDUPTHER

## 2020-04-20 DIAGNOSIS — J30.9 ALLERGIC RHINITIS: ICD-10-CM

## 2020-04-20 DIAGNOSIS — J45.20 MILD INTERMITTENT ASTHMA WITHOUT COMPLICATION: ICD-10-CM

## 2020-04-20 RX ORDER — MONTELUKAST SODIUM 10 MG/1
10 TABLET ORAL
Qty: 30 TABLET | Refills: 0 | Status: CANCELLED | OUTPATIENT
Start: 2020-04-20

## 2020-04-20 RX ORDER — ALBUTEROL SULFATE 90 UG/1
AEROSOL, METERED RESPIRATORY (INHALATION)
Qty: 8.5 G | Refills: 0 | Status: CANCELLED | OUTPATIENT
Start: 2020-04-20

## 2020-04-20 RX ORDER — FLUTICASONE PROPIONATE 50 MCG
2 SPRAY, SUSPENSION (ML) NASAL DAILY
Qty: 16 G | Refills: 0 | Status: CANCELLED | OUTPATIENT
Start: 2020-04-20

## 2020-04-21 ENCOUNTER — TELEMEDICINE (OUTPATIENT)
Dept: FAMILY MEDICINE CLINIC | Facility: CLINIC | Age: 25
End: 2020-04-21
Payer: COMMERCIAL

## 2020-04-21 DIAGNOSIS — J45.20 MILD INTERMITTENT ASTHMA WITHOUT COMPLICATION: ICD-10-CM

## 2020-04-21 DIAGNOSIS — J30.9 ALLERGIC RHINITIS: ICD-10-CM

## 2020-04-21 PROCEDURE — 99213 OFFICE O/P EST LOW 20 MIN: CPT | Performed by: FAMILY MEDICINE

## 2020-04-21 RX ORDER — FLUTICASONE PROPIONATE 50 MCG
2 SPRAY, SUSPENSION (ML) NASAL DAILY
Qty: 16 G | Refills: 5 | Status: SHIPPED | OUTPATIENT
Start: 2020-04-21 | End: 2021-06-12 | Stop reason: SDUPTHER

## 2020-04-21 RX ORDER — MONTELUKAST SODIUM 10 MG/1
10 TABLET ORAL
Qty: 30 TABLET | Refills: 5 | Status: SHIPPED | OUTPATIENT
Start: 2020-04-21 | End: 2020-08-05 | Stop reason: SDUPTHER

## 2020-04-21 RX ORDER — ALBUTEROL SULFATE 90 UG/1
2 AEROSOL, METERED RESPIRATORY (INHALATION) EVERY 6 HOURS PRN
Qty: 1 INHALER | Refills: 5 | Status: SHIPPED | OUTPATIENT
Start: 2020-04-21 | End: 2020-08-05 | Stop reason: SDUPTHER

## 2020-04-21 RX ORDER — ALBUTEROL SULFATE 90 UG/1
AEROSOL, METERED RESPIRATORY (INHALATION)
Qty: 1 INHALER | Refills: 5 | OUTPATIENT
Start: 2020-04-21

## 2020-08-05 ENCOUNTER — TELEPHONE (OUTPATIENT)
Dept: FAMILY MEDICINE CLINIC | Facility: CLINIC | Age: 25
End: 2020-08-05

## 2020-08-05 DIAGNOSIS — L30.9 ECZEMA, UNSPECIFIED TYPE: ICD-10-CM

## 2020-08-05 DIAGNOSIS — J45.20 MILD INTERMITTENT ASTHMA WITHOUT COMPLICATION: ICD-10-CM

## 2020-08-05 NOTE — TELEPHONE ENCOUNTER
Pt called and needs refills on the following:    - triamcinolone (kenalog) 0 1% cream  Applies externally to the affected area twice  A day  Qty: 80 g    - montelukast (singulair) 10mg tablet  Takes 1 tablet by mouth daily at bedtime  Qty: 30 tablet    - albuterol (proventil hfa, ventolin hfa) 90 mcg/act inhaler  Inhale 2 puffs every 6 hours as needed for wheezing  Dose: 2 puff  Qty: 1 inhaler      Send to [facility] on [address]

## 2020-08-07 RX ORDER — ALBUTEROL SULFATE 90 UG/1
2 AEROSOL, METERED RESPIRATORY (INHALATION) EVERY 6 HOURS PRN
Qty: 1 INHALER | Refills: 5 | Status: SHIPPED | OUTPATIENT
Start: 2020-08-07 | End: 2020-09-03

## 2020-08-07 RX ORDER — MONTELUKAST SODIUM 10 MG/1
10 TABLET ORAL
Qty: 30 TABLET | Refills: 5 | Status: SHIPPED | OUTPATIENT
Start: 2020-08-07 | End: 2021-06-12 | Stop reason: SDUPTHER

## 2020-08-07 RX ORDER — TRIAMCINOLONE ACETONIDE 1 MG/G
CREAM TOPICAL 2 TIMES DAILY
Qty: 80 G | Refills: 0 | Status: SHIPPED | OUTPATIENT
Start: 2020-08-07 | End: 2020-09-21

## 2020-09-02 DIAGNOSIS — J45.20 MILD INTERMITTENT ASTHMA WITHOUT COMPLICATION: ICD-10-CM

## 2020-09-03 RX ORDER — ALBUTEROL SULFATE 90 UG/1
AEROSOL, METERED RESPIRATORY (INHALATION)
Qty: 25.5 G | Refills: 0 | Status: SHIPPED | OUTPATIENT
Start: 2020-09-03 | End: 2020-12-02

## 2020-09-21 DIAGNOSIS — L30.9 ECZEMA, UNSPECIFIED TYPE: ICD-10-CM

## 2020-09-21 RX ORDER — TRIAMCINOLONE ACETONIDE 1 MG/G
CREAM TOPICAL
Qty: 80 G | Refills: 0 | Status: SHIPPED | OUTPATIENT
Start: 2020-09-21 | End: 2020-11-17 | Stop reason: SDUPTHER

## 2020-10-16 ENCOUNTER — TELEPHONE (OUTPATIENT)
Dept: FAMILY MEDICINE CLINIC | Facility: CLINIC | Age: 25
End: 2020-10-16

## 2020-11-17 DIAGNOSIS — L30.9 ECZEMA, UNSPECIFIED TYPE: ICD-10-CM

## 2020-11-17 RX ORDER — TRIAMCINOLONE ACETONIDE 1 MG/G
CREAM TOPICAL 2 TIMES DAILY
Qty: 80 G | Refills: 0 | Status: SHIPPED | OUTPATIENT
Start: 2020-11-17 | End: 2020-12-15 | Stop reason: SDUPTHER

## 2020-12-02 DIAGNOSIS — J45.20 MILD INTERMITTENT ASTHMA WITHOUT COMPLICATION: ICD-10-CM

## 2020-12-02 RX ORDER — ALBUTEROL SULFATE 90 UG/1
AEROSOL, METERED RESPIRATORY (INHALATION)
Qty: 25.5 G | Refills: 0 | Status: SHIPPED | OUTPATIENT
Start: 2020-12-02 | End: 2021-06-12 | Stop reason: SDUPTHER

## 2020-12-15 DIAGNOSIS — J30.9 ALLERGIC RHINITIS: ICD-10-CM

## 2020-12-15 DIAGNOSIS — L30.9 ECZEMA, UNSPECIFIED TYPE: ICD-10-CM

## 2020-12-15 RX ORDER — FLUTICASONE PROPIONATE 50 MCG
2 SPRAY, SUSPENSION (ML) NASAL DAILY
Qty: 16 G | Refills: 5 | Status: CANCELLED | OUTPATIENT
Start: 2020-12-15

## 2020-12-16 RX ORDER — TRIAMCINOLONE ACETONIDE 1 MG/G
CREAM TOPICAL 2 TIMES DAILY
Qty: 28.4 G | Refills: 0 | Status: SHIPPED | OUTPATIENT
Start: 2020-12-16 | End: 2021-02-11 | Stop reason: SDUPTHER

## 2021-02-11 DIAGNOSIS — L30.9 ECZEMA, UNSPECIFIED TYPE: ICD-10-CM

## 2021-02-12 RX ORDER — TRIAMCINOLONE ACETONIDE 1 MG/G
CREAM TOPICAL 2 TIMES DAILY
Qty: 28.4 G | Refills: 0 | Status: SHIPPED | OUTPATIENT
Start: 2021-02-12

## 2021-04-06 DIAGNOSIS — J45.20 MILD INTERMITTENT ASTHMA WITHOUT COMPLICATION: ICD-10-CM

## 2021-04-06 RX ORDER — MONTELUKAST SODIUM 10 MG/1
TABLET ORAL
Qty: 90 TABLET | OUTPATIENT
Start: 2021-04-06

## 2021-06-12 DIAGNOSIS — J30.9 ALLERGIC RHINITIS: ICD-10-CM

## 2021-06-12 DIAGNOSIS — J45.20 MILD INTERMITTENT ASTHMA WITHOUT COMPLICATION: ICD-10-CM

## 2021-06-12 RX ORDER — FLUTICASONE PROPIONATE 50 MCG
2 SPRAY, SUSPENSION (ML) NASAL DAILY
Qty: 16 G | Refills: 0 | Status: SHIPPED | OUTPATIENT
Start: 2021-06-12

## 2021-06-12 RX ORDER — MONTELUKAST SODIUM 10 MG/1
10 TABLET ORAL
Qty: 30 TABLET | Refills: 0 | Status: SHIPPED | OUTPATIENT
Start: 2021-06-12

## 2021-06-12 RX ORDER — ALBUTEROL SULFATE 90 UG/1
2 AEROSOL, METERED RESPIRATORY (INHALATION) EVERY 6 HOURS PRN
Qty: 18 G | Refills: 0 | Status: SHIPPED | OUTPATIENT
Start: 2021-06-12 | End: 2021-07-05

## 2021-06-12 NOTE — TELEPHONE ENCOUNTER
Pt will be establishing with another provider due to her moving to [facility] and [employer]  I advised pt we will cover her for 30 days since it has already been over a year since she has been seen in our office and then she will have to get a new script from new PCP  Pt will call us to notify which PCP she will be transferring to so we can document update in chart

## 2021-07-04 DIAGNOSIS — J45.20 MILD INTERMITTENT ASTHMA WITHOUT COMPLICATION: ICD-10-CM

## 2021-07-04 NOTE — PROGRESS NOTES
Assessment/Plan:      Diagnoses and all orders for this visit:    Bronchitis    Mild intermittent asthma without complication  -     montelukast (SINGULAIR) 10 mg tablet; Take 1 tablet (10 mg total) by mouth daily at bedtime  -     albuterol (PROAIR HFA) 90 mcg/act inhaler; Inhale 2 puffs every 6 (six) hours as needed for wheezing or shortness of breath  -     azithromycin (ZITHROMAX) 250 mg tablet; Take 2 tabs PO x 1, then 1 tab PO daily x 4 days    Hypertrophy of tonsils  -     Ambulatory Referral to Otolaryngology; Future    Other orders  -     triamcinolone (KENALOG) 0 1 % cream; ROSSI EXT AA 2 TO 3 XD  -     Discontinue: montelukast (SINGULAIR) 10 mg tablet; TK 1 T PO D UTD  -     Discontinue: Crisaborole (EUCRISA) 2 % OINT; Apply topically        34-year-old female presenting today for concern of 2-3 days of lower respiratory symptoms consistent with bronchitis  She does have asthma as well and has been using her rescue inhaler a little more than usual   I will treat her with a course of azithromycin and recommended Mucinex DM OTC  She can also continue using her rescue inhaler as needed  She refused any steroids at this point today but if she is continuing with symptoms I may consider putting her on a steroid taper  Regarding her mild intermittent asthma, she has otherwise been well controlled and has been only needing the ProAir about once or twice a month  She will be continued on singular daily and both a rescue inhaler and Singulair were refilled today  She has had issues with enlargement of tonsils / hypertrophy in the past and has been discussed with her many times about considering seeing ENT but has never followed up  On exam she continues having chronic enlargement of the tonsils but is otherwise asymptomatic  She is requesting referral again for consultations so I did provide that to her  She should call or follow up with persistent or worsening symptoms      Chief Complaint   Patient presents with    Cold Like Symptoms     for about 3 days taking otc medications     Cough    Headache       Subjective:     Patient ID: Mollie Saint is a 24 y o  female  24y/o female here today for URI sxs past 2-3 days  Reports chest hurting, feeling hot/cold, coughing and chest tightness  Also bringing up a lot of mucous  Nasal congestion, headaches  No fevers  Tried tussin  Has been using proair more than usual, helping with tightness  She also has not seen ENT yet as referred last year for hypertrophy tonsils and large tonsils  She is still interested in seeing them  Asthma otherwise has been stable, maybe uses rescue 1-2 x a month  Review of Systems   Constitutional: Positive for chills and fatigue  Negative for fever  HENT: Positive for congestion  Negative for ear pain and sore throat  Respiratory: Positive for cough, chest tightness and wheezing  Cardiovascular: Negative  Neurological: Negative  Psychiatric/Behavioral: Negative  The following portions of the patient's history were reviewed and updated as appropriate: allergies, current medications, past family history, past medical history, past social history, past surgical history and problem list       Objective:     Physical Exam   Constitutional: She is oriented to person, place, and time  She appears well-developed  No distress  Appears mildly ill   HENT:   Head: Normocephalic  Right Ear: Tympanic membrane and ear canal normal    Left Ear: Tympanic membrane and ear canal normal    Nose: Nose normal    Mouth/Throat: Oropharynx is clear and moist    Tonsils 3+ B/L, chronic  Not red or swollen, no exudate   Neck: Neck supple  Cardiovascular: Normal rate and regular rhythm  Pulmonary/Chest: Effort normal  She has decreased breath sounds (mild throughout)  She has wheezes (low wheeze on expiration B/L upper and lower lung fields)  Neurological: She is alert and oriented to person, place, and time     Psychiatric: She has a normal mood and affect  Vitals reviewed        Vitals:    09/11/18 1450   BP: 106/80   BP Location: Left arm   Patient Position: Sitting   Cuff Size: Adult   Pulse: 90   Resp: 15   Temp: 98 7 °F (37 1 °C)   TempSrc: Tympanic   SpO2: 98%   Weight: 79 1 kg (174 lb 4 8 oz)   Height: 5' 2 21" (1 58 m) ,[email or web address],DirectAddress_[email or web address] ,[email or web address],DirectAddress_Unknown,[email or web address]

## 2021-07-05 RX ORDER — ALBUTEROL SULFATE 90 UG/1
AEROSOL, METERED RESPIRATORY (INHALATION)
Qty: 18 G | Refills: 2 | Status: SHIPPED | OUTPATIENT
Start: 2021-07-05

## 2024-02-05 ENCOUNTER — OFFICE VISIT (OUTPATIENT)
Dept: FAMILY MEDICINE CLINIC | Facility: CLINIC | Age: 29
End: 2024-02-05
Payer: COMMERCIAL

## 2024-02-05 VITALS
WEIGHT: 169.2 LBS | HEIGHT: 63 IN | SYSTOLIC BLOOD PRESSURE: 128 MMHG | BODY MASS INDEX: 29.98 KG/M2 | DIASTOLIC BLOOD PRESSURE: 86 MMHG | OXYGEN SATURATION: 98 % | TEMPERATURE: 98.3 F | HEART RATE: 84 BPM

## 2024-02-05 DIAGNOSIS — J45.20 MILD INTERMITTENT ASTHMA WITHOUT COMPLICATION: ICD-10-CM

## 2024-02-05 DIAGNOSIS — L73.9 FOLLICULITIS: ICD-10-CM

## 2024-02-05 DIAGNOSIS — Z13.220 SCREENING FOR LIPID DISORDERS: ICD-10-CM

## 2024-02-05 DIAGNOSIS — J01.90 ACUTE NON-RECURRENT SINUSITIS, UNSPECIFIED LOCATION: Primary | ICD-10-CM

## 2024-02-05 DIAGNOSIS — Z11.3 SCREEN FOR STD (SEXUALLY TRANSMITTED DISEASE): ICD-10-CM

## 2024-02-05 DIAGNOSIS — Z13.0 SCREENING FOR DEFICIENCY ANEMIA: ICD-10-CM

## 2024-02-05 DIAGNOSIS — J30.9 ALLERGIC RHINITIS: ICD-10-CM

## 2024-02-05 DIAGNOSIS — Z13.29 SCREENING FOR THYROID DISORDER: ICD-10-CM

## 2024-02-05 DIAGNOSIS — Z11.59 NEED FOR HEPATITIS C SCREENING TEST: ICD-10-CM

## 2024-02-05 DIAGNOSIS — Z13.1 SCREENING FOR DIABETES MELLITUS: ICD-10-CM

## 2024-02-05 DIAGNOSIS — Z11.4 SCREENING FOR HIV (HUMAN IMMUNODEFICIENCY VIRUS): ICD-10-CM

## 2024-02-05 DIAGNOSIS — M21.41 FLAT FEET: ICD-10-CM

## 2024-02-05 DIAGNOSIS — L30.9 ECZEMA, UNSPECIFIED TYPE: ICD-10-CM

## 2024-02-05 DIAGNOSIS — M21.42 FLAT FEET: ICD-10-CM

## 2024-02-05 PROCEDURE — 99204 OFFICE O/P NEW MOD 45 MIN: CPT

## 2024-02-05 RX ORDER — ALBUTEROL SULFATE 90 UG/1
2 AEROSOL, METERED RESPIRATORY (INHALATION) EVERY 6 HOURS PRN
Qty: 18 G | Refills: 2 | Status: SHIPPED | OUTPATIENT
Start: 2024-02-05

## 2024-02-05 RX ORDER — MONTELUKAST SODIUM 10 MG/1
10 TABLET ORAL
Qty: 30 TABLET | Refills: 3 | Status: SHIPPED | OUTPATIENT
Start: 2024-02-05

## 2024-02-05 RX ORDER — TRIAMCINOLONE ACETONIDE 1 MG/G
CREAM TOPICAL 2 TIMES DAILY
Qty: 45 G | Refills: 2 | Status: SHIPPED | OUTPATIENT
Start: 2024-02-05

## 2024-02-05 RX ORDER — FLUTICASONE PROPIONATE 50 MCG
2 SPRAY, SUSPENSION (ML) NASAL DAILY
Qty: 16 G | Refills: 0 | Status: SHIPPED | OUTPATIENT
Start: 2024-02-05

## 2024-02-05 RX ORDER — AMOXICILLIN AND CLAVULANATE POTASSIUM 875; 125 MG/1; MG/1
1 TABLET, FILM COATED ORAL EVERY 12 HOURS SCHEDULED
Qty: 14 TABLET | Refills: 0 | Status: SHIPPED | OUTPATIENT
Start: 2024-02-05 | End: 2024-02-12

## 2024-02-05 NOTE — ASSESSMENT & PLAN NOTE
Currently using albuterol PRN and Singulair at bedtime. She states she has been out of the Singulair for about 6 months and feels she needs to go back on it. Refill for both given today.

## 2024-02-05 NOTE — ASSESSMENT & PLAN NOTE
Patient has several spots on her bilateral ankles that appear to be pustular around the hair follicles. This is consistent with a folliculitis. Discussed with her that Augmentin will cover her for a folliculitis, and I am also giving her Bactroban ointment to apply once daily. Referral placed for dermatology for further evaluation and treatment. Discussed with patient she should avoid wearing high socks, as the moist environment is likely attributing to the pustules.

## 2024-02-05 NOTE — PATIENT INSTRUCTIONS
[facility] - [phone]    Start using Bactroban on the legs for the folliculitis once a day     Start taking Augmentin for the sinus infection    Restart other medications as discussed

## 2024-02-05 NOTE — ASSESSMENT & PLAN NOTE
Patient reports she was seeing a podiatrist for flat feet at her old clinic, and would like to become established with our podiatrists for continued evaluation and management. Referral placed today.

## 2024-02-05 NOTE — ASSESSMENT & PLAN NOTE
Patient with history of eczema which has been treated with Kenalog cream. She requested dermatology and allergy referral today, as she needs to become reestablished with them as well.

## 2024-02-05 NOTE — ASSESSMENT & PLAN NOTE
Patient with 2 week long history of sinus congestion, sore throat, ear fullness, and cough. Patient's exam today consistent with an acute sinus infection. Patient given Augmentin to cover for sinusitis and she should also begin using the Flonase daily. Also recommended sinus rinses and proper hydration. Patient is to follow up with us if symptoms do not improve after antibiotic.

## 2024-02-05 NOTE — PROGRESS NOTES
Name: Adrianna Becerra      : 1995      MRN: [id number]  Encounter Provider: Laura Sloan PA-C  Encounter Date: 2024   Encounter department: [facility]    Assessment & Plan     1. Acute non-recurrent sinusitis, unspecified location  Assessment & Plan:  Patient with 2 week long history of sinus congestion, sore throat, ear fullness, and cough. Patient's exam today consistent with an acute sinus infection. Patient given Augmentin to cover for sinusitis and she should also begin using the Flonase daily. Also recommended sinus rinses and proper hydration. Patient is to follow up with us if symptoms do not improve after antibiotic.     Orders:  -     amoxicillin-clavulanate (AUGMENTIN) 875-125 mg per tablet; Take 1 tablet by mouth every 12 (twelve) hours for 7 days    2. Folliculitis  Assessment & Plan:  Patient has several spots on her bilateral ankles that appear to be pustular around the hair follicles. This is consistent with a folliculitis. Discussed with her that Augmentin will cover her for a folliculitis, and I am also giving her Bactroban ointment to apply once daily. Referral placed for dermatology for further evaluation and treatment. Discussed with patient she should avoid wearing high socks, as the moist environment is likely attributing to the pustules.    Orders:  -     amoxicillin-clavulanate (AUGMENTIN) 875-125 mg per tablet; Take 1 tablet by mouth every 12 (twelve) hours for 7 days  -     mupirocin (BACTROBAN) 2 % ointment; Apply topically daily    3. Mild intermittent asthma without complication  Assessment & Plan:  Currently using albuterol PRN and Singulair at bedtime. She states she has been out of the Singulair for about 6 months and feels she needs to go back on it. Refill for both given today.     Orders:  -     albuterol (PROVENTIL HFA,VENTOLIN HFA) 90 mcg/act inhaler; Inhale 2 puffs every 6 (six) hours as needed for wheezing  -     Ambulatory Referral to  Allergy; Future  -     montelukast (SINGULAIR) 10 mg tablet; Take 1 tablet (10 mg total) by mouth daily at bedtime    4. Eczema, unspecified type  Assessment & Plan:  Patient with history of eczema which has been treated with Kenalog cream. She requested dermatology and allergy referral today, as she needs to become reestablished with them as well.     Orders:  -     Ambulatory Referral to Allergy; Future  -     Ambulatory Referral to Dermatology; Future  -     triamcinolone (KENALOG) 0.1 % cream; Apply topically 2 (two) times a day    5. Flat feet  Assessment & Plan:  Patient reports she was seeing a podiatrist for flat feet at her old clinic, and would like to become established with our podiatrists for continued evaluation and management. Referral placed today.    Orders:  -     Ambulatory Referral to Podiatry; Future    6. Need for hepatitis C screening test  -     Hepatitis C Antibody; Future; Expected date: 07/01/2024    7. Allergic rhinitis  -     fluticasone (FLONASE) 50 mcg/act nasal spray; 2 sprays into each nostril daily Shake before use  -     Ambulatory Referral to Allergy; Future    8. Screening for HIV (human immunodeficiency virus)  -     HIV 1/2 AG/AB w Reflex [facility] for 2 yr old and above; Future; Expected date: 07/01/2024    9. Screen for STD (sexually transmitted disease)  -     RPR-Syphilis Screening (Total Syphilis IGG/IGM); Future; Expected date: 07/01/2024  -     Chlamydia/GC amplified DNA by PCR; Future; Expected date: 07/01/2024    10. Screening for deficiency anemia  -     CBC and differential; Future; Expected date: 07/01/2024    11. Screening for diabetes mellitus  -     Comprehensive metabolic panel; Future; Expected date: 07/01/2024    12. Screening for lipid disorders  -     Lipid Panel with Direct LDL reflex; Future; Expected date: 07/01/2024    13. Screening for thyroid disorder  -     TSH, 3rd generation with Free T4 reflex; Future; Expected date: 07/01/2024         Patient will  follow up with me in 6 months with fasting labs prior.  Subjective      Patient presents today to establish care with our practice. Patient is in need of medication refills today. Patient is also concerned about two week long congestion. She states she has had sinus infections before and this feels similar. She states her throat is also sore. Her ears feel full as well. No fever or chills. She did not test herself for COVID. She has been taking OTC mucinex and allegra.     She is requesting referrals to allergy, dermatology, and podiatry.       Sinus Problem  This is a new problem. The current episode started 1 to 4 weeks ago. The problem is unchanged. There has been no fever. The pain is mild. Associated symptoms include congestion, coughing and a sore throat. Pertinent negatives include no chills, ear pain, headaches, shortness of breath, sinus pressure or swollen glands. Past treatments include oral decongestants. The treatment provided mild relief.     Review of Systems   Constitutional:  Negative for chills, fatigue and fever.   HENT:  Positive for congestion and sore throat. Negative for ear pain and sinus pressure.    Respiratory:  Positive for cough. Negative for chest tightness and shortness of breath.    Cardiovascular:  Negative for chest pain, palpitations and leg swelling.   Skin:  Positive for rash. Negative for color change.   Neurological:  Negative for dizziness, light-headedness and headaches.       Current Outpatient Medications on File Prior to Visit   Medication Sig    [DISCONTINUED] albuterol (PROVENTIL HFA,VENTOLIN HFA) 90 mcg/act inhaler TAKE 2 PUFFS BY MOUTH EVERY 6 HOURS AS NEEDED FOR WHEEZE (Patient not taking: Reported on 2/5/2024)    [DISCONTINUED] fluticasone (FLONASE) 50 mcg/act nasal spray 2 sprays into each nostril daily Shake before use (Patient not taking: Reported on 2/5/2024)    [DISCONTINUED] montelukast (SINGULAIR) 10 mg tablet Take 1 tablet (10 mg total) by mouth daily at  "bedtime (Patient not taking: Reported on 2/5/2024)    [DISCONTINUED] triamcinolone (KENALOG) 0.1 % cream Apply topically 2 (two) times a day (Patient not taking: Reported on 2/5/2024)       Objective     /86 (BP Location: Left arm, Patient Position: Sitting, Cuff Size: Large)   Pulse 84   Temp 98.3 °F (36.8 °C) (Temporal)   Ht 5' 2.5\" (1.588 m)   Wt 76.7 kg (169 lb 3.2 oz)   SpO2 98%   BMI 30.45 kg/m²     Physical Exam  Vitals and nursing note reviewed.   Constitutional:       General: She is not in acute distress.     Appearance: Normal appearance. She is not ill-appearing.   HENT:      Head: Normocephalic and atraumatic.      Right Ear: No middle ear effusion.      Left Ear:  No middle ear effusion.      Nose: Congestion present.      Mouth/Throat:      Mouth: Mucous membranes are moist.      Pharynx: Oropharynx is clear. Posterior oropharyngeal erythema present. No oropharyngeal exudate.      Tonsils: No tonsillar exudate or tonsillar abscesses. 3+ on the right. 3+ on the left.   Cardiovascular:      Rate and Rhythm: Normal rate and regular rhythm.      Heart sounds: No murmur heard.  Pulmonary:      Effort: Pulmonary effort is normal. No respiratory distress.      Breath sounds: No stridor. No wheezing, rhonchi or rales.   Skin:     General: Skin is warm and dry.      Coloration: Skin is not pale.      Findings: Rash present. Rash is pustular. Rash is not crusting, scaling, urticarial or vesicular.          Neurological:      General: No focal deficit present.      Mental Status: She is alert and oriented to person, place, and time.   Psychiatric:         Mood and Affect: Mood normal.         Behavior: Behavior normal.         Judgment: Judgment normal.       Laura Sloan PA-C    "

## 2024-02-21 PROBLEM — J01.90 ACUTE NON-RECURRENT SINUSITIS: Status: RESOLVED | Noted: 2024-02-05 | Resolved: 2024-02-21

## 2024-03-11 ENCOUNTER — TELEPHONE (OUTPATIENT)
Age: 29
End: 2024-03-11

## 2024-03-11 NOTE — TELEPHONE ENCOUNTER
Staff, Mj, called to report that the patient was admitted on 3/10/24 to their facility. This was an   FYI.

## 2024-03-14 ENCOUNTER — RA CDI HCC (OUTPATIENT)
Dept: OTHER | Facility: HOSPITAL | Age: 29
End: 2024-03-14

## 2024-03-19 ENCOUNTER — TELEPHONE (OUTPATIENT)
Age: 29
End: 2024-03-19

## 2024-03-19 ENCOUNTER — TELEPHONE (OUTPATIENT)
Dept: PSYCHIATRY | Facility: CLINIC | Age: 29
End: 2024-03-19

## 2024-03-19 ENCOUNTER — OFFICE VISIT (OUTPATIENT)
Dept: FAMILY MEDICINE CLINIC | Facility: CLINIC | Age: 29
End: 2024-03-19
Payer: COMMERCIAL

## 2024-03-19 VITALS
WEIGHT: 166 LBS | HEART RATE: 120 BPM | OXYGEN SATURATION: 100 % | SYSTOLIC BLOOD PRESSURE: 140 MMHG | TEMPERATURE: 99.1 F | DIASTOLIC BLOOD PRESSURE: 86 MMHG | BODY MASS INDEX: 29.88 KG/M2

## 2024-03-19 DIAGNOSIS — L30.9 ECZEMA, UNSPECIFIED TYPE: ICD-10-CM

## 2024-03-19 DIAGNOSIS — F41.9 ANXIETY: Primary | ICD-10-CM

## 2024-03-19 DIAGNOSIS — L73.9 FOLLICULITIS: ICD-10-CM

## 2024-03-19 DIAGNOSIS — H10.9 BACTERIAL CONJUNCTIVITIS: ICD-10-CM

## 2024-03-19 PROCEDURE — 99214 OFFICE O/P EST MOD 30 MIN: CPT

## 2024-03-19 RX ORDER — POLYMYXIN B SULFATE AND TRIMETHOPRIM 1; 10000 MG/ML; [USP'U]/ML
1 SOLUTION OPHTHALMIC EVERY 4 HOURS
Qty: 10 ML | Refills: 0 | Status: SHIPPED | OUTPATIENT
Start: 2024-03-19

## 2024-03-19 RX ORDER — HYDROXYZINE HYDROCHLORIDE 25 MG/1
25 TABLET, FILM COATED ORAL EVERY 6 HOURS PRN
Qty: 30 TABLET | Refills: 2 | Status: SHIPPED | OUTPATIENT
Start: 2024-03-19

## 2024-03-19 RX ORDER — TRIAMCINOLONE ACETONIDE 1 MG/G
CREAM TOPICAL 2 TIMES DAILY
Qty: 45 G | Refills: 2 | Status: SHIPPED | OUTPATIENT
Start: 2024-03-19

## 2024-03-19 NOTE — ASSESSMENT & PLAN NOTE
Patient presents today for follow-up from a recent stay in [facility] from 3/10 - 3/14.  Patient was taken originally to [facility] due to psychosis symptoms.  She received the proper workup in the ER and they ruled out any other medical cause to her symptoms.  She was taken to the psychiatric hospital and stayed for about 4 to 5 days.  She states she was not given a specific diagnosis in the hospital, and she felt like she could not trust the providers in the hospital, so she requested to leave as soon as she could.  Upon chart review, it looks like she was diagnosed with severe major depressive disorder with psychosis as well as a personality disorder, undetermined which specific personality disorder they diagnosed her with.  They did start her on Zoloft 50 mg, however she has not started it yet as she is concerned about the possible side effects.    Discussed with her today that I do think it is appropriate that she is put on Zoloft 50 mg.  She is not currently set up with a psychiatrist, so I will take over the medication for now until she can get in with psychiatry.  She is seeing therapy every single morning and is discussing her PTSD from her past relationships.  She states she will  the Zoloft today and start using it.  I also recommended using hydroxyzine as needed for anxiety attacks, which she is agreeable to take.  Patient has the proper resources set up for her mental health.  She currently denies any suicidal or homicidal ideations.  She is aware of what resources she can reach out to in case these thoughts develop.  Patient is agreeable to follow-up with me in 1 month to determine if the Zoloft 50 mg is working well for her.  Discussed with her we may need to increase to the max dose in the event that it is not working well for her.

## 2024-03-19 NOTE — PROGRESS NOTES
Name: Adrianna Becerra      : 1995      MRN: [id number]  Encounter Provider: Laura Sloan PA-C  Encounter Date: 3/19/2024   Encounter department: [facility]    Assessment & Plan     1. Anxiety  Assessment & Plan:  Patient presents today for follow-up from a recent stay in [facility] from 3/10 - 3/14.  Patient was taken originally to [facility] ER due to psychosis symptoms.  She received the proper workup in the ER and they ruled out any other medical cause to her symptoms.  She was taken to the psychiatric hospital and stayed for about 4 to 5 days.  She states she was not given a specific diagnosis in the hospital, and she felt like she could not trust the providers in the hospital, so she requested to leave as soon as she could.  Upon chart review, it looks like she was diagnosed with severe major depressive disorder with psychosis as well as a personality disorder, undetermined which specific personality disorder they diagnosed her with.  They did start her on Zoloft 50 mg, however she has not started it yet as she is concerned about the possible side effects.    Discussed with her today that I do think it is appropriate that she is put on Zoloft 50 mg.  She is not currently set up with a psychiatrist, so I will take over the medication for now until she can get in with psychiatry.  She is seeing therapy every single morning and is discussing her PTSD from her past relationships.  She states she will  the Zoloft today and start using it.  I also recommended using hydroxyzine as needed for anxiety attacks, which she is agreeable to take.  Patient has the proper resources set up for her mental health.  She currently denies any suicidal or homicidal ideations.  She is aware of what resources she can reach out to in case these thoughts develop.  Patient is agreeable to follow-up with me in 1 month to determine if the Zoloft 50 mg is working well for her.   Discussed with her we may need to increase to the max dose in the event that it is not working well for her.    Orders:  -     hydrOXYzine HCL (ATARAX) 25 mg tablet; Take 1 tablet (25 mg total) by mouth every 6 (six) hours as needed for anxiety  -     Ambulatory referral to Psych Services; Future    2. Bacterial conjunctivitis  Assessment & Plan:  Patient states her eyes have been swollen ever since being in [facility]. She is not sure if it is from the pillows or from the face wash they provided. She states she has been waking up with crusting on the eyelashes. She has been using an eye wash, which is slowly helping with her symptoms.  Eyes examined today which are swollen on both sides.  No crusting seen on exam today.  Likely there is an allergic component to her symptoms as well, as she is allergic to cat dander and is currently living in a house with a cat.  However, will treat her for bacterial conjunctivitis with Polytrim eyedrops and also counseled her she may use dry eye solution over-the-counter to help with her symptoms.  Patient agreeable with this plan.    Orders:  -     polymyxin b-trimethoprim (POLYTRIM) ophthalmic solution; Administer 1 drop to both eyes every 4 (four) hours    3. Folliculitis  -     mupirocin (BACTROBAN) 2 % ointment; Apply topically daily    4. Eczema, unspecified type  -     triamcinolone (KENALOG) 0.1 % cream; Apply topically 2 (two) times a day           Subjective      Patient presents today for follow-up from a hospital stay at [facility] from 3/10 - 3/14.  Patient was admitted to the hospital after an ER stay at [facility] due to anxiety.  While in the behavioral hospital, she was diagnosed with severe major depressive disorder with psychosis and a unspecified personality disorder.  She was started on Zoloft 50 mg, however she did not start taking it.  She is currently seeing a therapist every morning from 10-12 PM.  She is not  currently set up with a psychiatrist and is requesting a referral today.  She states she is still having anxiety attacks while she is talking through her PTSD with her therapist.  She denies any suicidal or homicidal ideations.    She states her eyes have also been swollen since being in the hospital.  She states sometimes they are crusty when she wakes up in the morning.  She has had no other drainage from the eyes.        Review of Systems   Constitutional:  Negative for chills, fatigue and fever.   Eyes:  Positive for pain and redness.   Respiratory:  Negative for cough, chest tightness and shortness of breath.    Cardiovascular:  Negative for chest pain, palpitations and leg swelling.   Neurological:  Negative for dizziness, light-headedness and headaches.   Psychiatric/Behavioral:  Positive for dysphoric mood and sleep disturbance. Negative for agitation, behavioral problems, confusion, hallucinations and suicidal ideas. The patient is nervous/anxious. The patient is not hyperactive.        Current Outpatient Medications on File Prior to Visit   Medication Sig    albuterol (PROVENTIL HFA,VENTOLIN HFA) 90 mcg/act inhaler Inhale 2 puffs every 6 (six) hours as needed for wheezing    fluticasone (FLONASE) 50 mcg/act nasal spray 2 sprays into each nostril daily Shake before use    montelukast (SINGULAIR) 10 mg tablet Take 1 tablet (10 mg total) by mouth daily at bedtime    sertraline (ZOLOFT) 50 mg tablet Take 50 mg by mouth daily    [DISCONTINUED] mupirocin (BACTROBAN) 2 % ointment Apply topically daily (Patient not taking: Reported on 3/19/2024)    [DISCONTINUED] triamcinolone (KENALOG) 0.1 % cream Apply topically 2 (two) times a day (Patient not taking: Reported on 3/19/2024)       Objective     /86 (BP Location: Left arm, Patient Position: Sitting, Cuff Size: Standard)   Pulse (!) 120   Temp 99.1 °F (37.3 °C) (Temporal)   Wt 75.3 kg (166 lb)   SpO2 100%   BMI 29.88 kg/m²     Physical Exam  Vitals and  nursing note reviewed.   Constitutional:       General: She is not in acute distress.     Appearance: Normal appearance. She is not ill-appearing or diaphoretic.   HENT:      Head: Normocephalic and atraumatic.   Cardiovascular:      Rate and Rhythm: Regular rhythm. Tachycardia present.      Heart sounds: No murmur heard.  Pulmonary:      Effort: Pulmonary effort is normal. No respiratory distress.      Breath sounds: Normal breath sounds.   Musculoskeletal:      Right lower leg: No edema.      Left lower leg: No edema.   Skin:     Coloration: Skin is not cyanotic or pale.   Neurological:      General: No focal deficit present.      Mental Status: She is alert and oriented to person, place, and time. Mental status is at baseline.   Psychiatric:         Mood and Affect: Mood normal.         Behavior: Behavior normal. Behavior is cooperative.         Judgment: Judgment normal.       Laura Sloan PA-C

## 2024-03-19 NOTE — ASSESSMENT & PLAN NOTE
Patient states her eyes have been swollen ever since being in [facility]. She is not sure if it is from the pillows or from the face wash they provided. She states she has been waking up with crusting on the eyelashes. She has been using an eye wash, which is slowly helping with her symptoms.  Eyes examined today which are swollen on both sides.  No crusting seen on exam today.  Likely there is an allergic component to her symptoms as well, as she is allergic to cat dander and is currently living in a house with a cat.  However, will treat her for bacterial conjunctivitis with Polytrim eyedrops and also counseled her she may use dry eye solution over-the-counter to help with her symptoms.  Patient agreeable with this plan.

## 2024-03-20 ENCOUNTER — TELEPHONE (OUTPATIENT)
Dept: PSYCHIATRY | Facility: CLINIC | Age: 29
End: 2024-03-20

## 2024-03-21 ENCOUNTER — TELEPHONE (OUTPATIENT)
Dept: PSYCHIATRY | Facility: CLINIC | Age: 29
End: 2024-03-21

## 2024-03-21 NOTE — TELEPHONE ENCOUNTER
Contacted PT. in regards to ASAP/STAT Referral, writer disclosed who was calling and pt hung up. Referral closed as is the 3rd attempt.

## 2024-03-28 DIAGNOSIS — J30.9 ALLERGIC RHINITIS: ICD-10-CM

## 2024-03-28 RX ORDER — FLUTICASONE PROPIONATE 50 MCG
SPRAY, SUSPENSION (ML) NASAL
Qty: 48 ML | Refills: 1 | Status: SHIPPED | OUTPATIENT
Start: 2024-03-28

## 2024-04-11 ENCOUNTER — TELEPHONE (OUTPATIENT)
Age: 29
End: 2024-04-11

## 2024-04-12 NOTE — TELEPHONE ENCOUNTER
Contacted patient in regards to ASAP Referral  in attempts to verify patient's needs of services and add patient to proper wait list. LVM for patient to contact intake dept  in regards to referral and possible scheduling.

## 2024-04-19 ENCOUNTER — OFFICE VISIT (OUTPATIENT)
Dept: FAMILY MEDICINE CLINIC | Facility: CLINIC | Age: 29
End: 2024-04-19
Payer: COMMERCIAL

## 2024-04-19 VITALS
HEIGHT: 63 IN | TEMPERATURE: 98.5 F | OXYGEN SATURATION: 99 % | BODY MASS INDEX: 30.16 KG/M2 | DIASTOLIC BLOOD PRESSURE: 64 MMHG | WEIGHT: 170.2 LBS | HEART RATE: 69 BPM | SYSTOLIC BLOOD PRESSURE: 108 MMHG

## 2024-04-19 DIAGNOSIS — H10.13 ALLERGIC CONJUNCTIVITIS OF BOTH EYES: ICD-10-CM

## 2024-04-19 DIAGNOSIS — L73.9 FOLLICULITIS: ICD-10-CM

## 2024-04-19 DIAGNOSIS — F41.9 ANXIETY: Primary | ICD-10-CM

## 2024-04-19 DIAGNOSIS — Z01.419 WELL WOMAN EXAM: ICD-10-CM

## 2024-04-19 PROBLEM — H10.9 BACTERIAL CONJUNCTIVITIS: Status: RESOLVED | Noted: 2024-03-19 | Resolved: 2024-04-19

## 2024-04-19 PROCEDURE — 99214 OFFICE O/P EST MOD 30 MIN: CPT

## 2024-04-19 NOTE — ASSESSMENT & PLAN NOTE
Patient's eye discharge improved with the Polytrim drops however she remains with excessive tearing and redness.  She does have prior history of dry eye syndrome however the has never been this bad before.  She believes it may be due to her allergies.  Recommended over-the-counter Pataday or dizziness drops for allergic conjunctivitis.  Recommended Systane for dry eye syndrome.  Also given referral for ophthalmology in the event that over-the-counter treatment would not work.  Patient agreeable with this plan.

## 2024-04-19 NOTE — ASSESSMENT & PLAN NOTE
Patient presents today for follow-up of her anxiety.  She has discontinued the Zoloft, as it was making her anxiety worse.  She is currently taking hydroxyzine about twice a day, sometimes more if she needs it.  She feels her anxiety is much better than last time we met.  She is continuing with the partial psychiatry program, she has therapy 4 times a week for 2 hours.  She feels her mood is still stabilizing.  She was not able to get in with psychiatry, phone number given today for her to call to get herself put on a wait list.  Patient is agreeable with staying on hydroxyzine, refills will be given when she requires them.  Discontinued Zoloft from her medication list.  She will follow-up with me sooner if needed.  No thoughts of suicide or homicide today.

## 2024-04-19 NOTE — PROGRESS NOTES
Name: Adrianna Becerra      : 1995      MRN: [id number]  Encounter Provider: Laura Sloan PA-C  Encounter Date: 2024   Encounter department: [facility]    Assessment & Plan     1. Anxiety  Assessment & Plan:  Patient presents today for follow-up of her anxiety.  She has discontinued the Zoloft, as it was making her anxiety worse.  She is currently taking hydroxyzine about twice a day, sometimes more if she needs it.  She feels her anxiety is much better than last time we met.  She is continuing with the partial psychiatry program, she has therapy 4 times a week for 2 hours.  She feels her mood is still stabilizing.  She was not able to get in with psychiatry, phone number given today for her to call to get herself put on a wait list.  Patient is agreeable with staying on hydroxyzine, refills will be given when she requires them.  Discontinued Zoloft from her medication list.  She will follow-up with me sooner if needed.  No thoughts of suicide or homicide today.      2. Allergic conjunctivitis of both eyes  Assessment & Plan:  Patient's eye discharge improved with the Polytrim drops however she remains with excessive tearing and redness.  She does have prior history of dry eye syndrome however the has never been this bad before.  She believes it may be due to her allergies.  Recommended over-the-counter Pataday or dizziness drops for allergic conjunctivitis.  Recommended Systane for dry eye syndrome.  Also given referral for ophthalmology in the event that over-the-counter treatment would not work.  Patient agreeable with this plan.    Orders:  -     Ambulatory Referral to Ophthalmology; Future    3. Well woman exam  -     Ambulatory Referral to Obstetrics / Gynecology; Future    4. Folliculitis  -     mupirocin (BACTROBAN) 2 % ointment; Apply topically daily         Follow-up in August for her annual physical, sooner with any acute concerns.  Subjective      Patient presents  today for follow-up of her anxiety.  She states she started taking the Zoloft for a few days after I saw her last, however it made anxiety worse so she discontinued it after a few days on her own.  She continues to take hydroxyzine usually about twice a day.  She is continuing with the partial psychiatry program, she sees a therapist 4 days a week for 2 hours in the morning.  She states she is continuing to work out her past traumas and at times it can be overwhelming for her, but she feels the anxiety as a whole is doing better.  She would like to remain on the hydroxyzine, but does not feel comfortable taking Zoloft continuously.    She also has been having a similar eye problem as last month.  She is having tearing of the eyes as well as redness.  She has been using the Polytrim drops without improvement of the watering eyes, did improve the discharge.  She believes it is coming from her allergies.      Review of Systems   Constitutional:  Negative for chills, fatigue and fever.   Eyes:  Positive for discharge and redness. Negative for photophobia, pain, itching and visual disturbance.   Respiratory:  Negative for cough, chest tightness and shortness of breath.    Cardiovascular:  Negative for chest pain, palpitations and leg swelling.   Neurological:  Negative for dizziness, light-headedness and headaches.   Psychiatric/Behavioral:  Positive for dysphoric mood (anxiety, improving). Negative for agitation, confusion, decreased concentration, self-injury, sleep disturbance and suicidal ideas. The patient is not nervous/anxious and is not hyperactive.        Current Outpatient Medications on File Prior to Visit   Medication Sig    albuterol (PROVENTIL HFA,VENTOLIN HFA) 90 mcg/act inhaler Inhale 2 puffs every 6 (six) hours as needed for wheezing    fluticasone (FLONASE) 50 mcg/act nasal spray 2 SPRAYS INTO EACH NOSTRIL DAILY ** SHAKE BEFORE USE**    hydrOXYzine HCL (ATARAX) 25 mg tablet Take 1 tablet (25 mg total)  "by mouth every 6 (six) hours as needed for anxiety    montelukast (SINGULAIR) 10 mg tablet Take 1 tablet (10 mg total) by mouth daily at bedtime    polymyxin b-trimethoprim (POLYTRIM) ophthalmic solution Administer 1 drop to both eyes every 4 (four) hours    triamcinolone (KENALOG) 0.1 % cream Apply topically 2 (two) times a day    [DISCONTINUED] mupirocin (BACTROBAN) 2 % ointment Apply topically daily    [DISCONTINUED] sertraline (ZOLOFT) 50 mg tablet Take 50 mg by mouth daily       Objective     /64 (BP Location: Left arm, Patient Position: Sitting, Cuff Size: Adult)   Pulse 69   Temp 98.5 °F (36.9 °C)   Ht 5' 2.5\" (1.588 m)   Wt 77.2 kg (170 lb 3.2 oz)   LMP 03/24/2024 (Exact Date)   SpO2 99%   BMI 30.63 kg/m²     Physical Exam  Vitals and nursing note reviewed.   Constitutional:       General: She is not in acute distress.     Appearance: Normal appearance. She is not ill-appearing or diaphoretic.   HENT:      Head: Normocephalic and atraumatic.   Eyes:      General: Lids are normal. No allergic shiner or visual field deficit.        Right eye: Discharge present. No foreign body.         Left eye: Discharge present.No foreign body.      Extraocular Movements: Extraocular movements intact.      Comments: Watery discharge from both eyes, appear to be red as well.    Pulmonary:      Effort: Pulmonary effort is normal. No respiratory distress.   Skin:     Coloration: Skin is not cyanotic or pale.   Neurological:      General: No focal deficit present.      Mental Status: She is alert and oriented to person, place, and time.   Psychiatric:         Mood and Affect: Mood normal.         Behavior: Behavior normal. Behavior is cooperative.         Judgment: Judgment normal.       Laura Sloan PA-C    "

## 2024-04-20 DIAGNOSIS — J45.20 MILD INTERMITTENT ASTHMA WITHOUT COMPLICATION: ICD-10-CM

## 2024-04-20 RX ORDER — MONTELUKAST SODIUM 10 MG/1
10 TABLET ORAL
Qty: 90 TABLET | Refills: 2 | Status: SHIPPED | OUTPATIENT
Start: 2024-04-20

## 2024-04-27 DIAGNOSIS — H10.9 BACTERIAL CONJUNCTIVITIS: ICD-10-CM

## 2024-04-29 RX ORDER — POLYMYXIN B SULFATE AND TRIMETHOPRIM 1; 10000 MG/ML; [USP'U]/ML
SOLUTION OPHTHALMIC
Qty: 10 ML | Refills: 0 | Status: SHIPPED | OUTPATIENT
Start: 2024-04-29

## 2024-04-30 ENCOUNTER — TELEPHONE (OUTPATIENT)
Dept: PSYCHIATRY | Facility: CLINIC | Age: 29
End: 2024-04-30

## 2024-05-15 ENCOUNTER — OFFICE VISIT (OUTPATIENT)
Dept: FAMILY MEDICINE CLINIC | Facility: CLINIC | Age: 29
End: 2024-05-15
Payer: COMMERCIAL

## 2024-05-15 VITALS
HEART RATE: 101 BPM | HEIGHT: 62 IN | TEMPERATURE: 98.8 F | DIASTOLIC BLOOD PRESSURE: 70 MMHG | OXYGEN SATURATION: 99 % | WEIGHT: 172 LBS | SYSTOLIC BLOOD PRESSURE: 110 MMHG | BODY MASS INDEX: 31.65 KG/M2

## 2024-05-15 DIAGNOSIS — L23.7 POISON IVY DERMATITIS: Primary | ICD-10-CM

## 2024-05-15 DIAGNOSIS — L30.9 ECZEMA, UNSPECIFIED TYPE: ICD-10-CM

## 2024-05-15 PROCEDURE — 99214 OFFICE O/P EST MOD 30 MIN: CPT

## 2024-05-15 RX ORDER — TRIAMCINOLONE ACETONIDE 1 MG/G
CREAM TOPICAL 2 TIMES DAILY
Qty: 45 G | Refills: 2 | Status: SHIPPED | OUTPATIENT
Start: 2024-05-15

## 2024-05-15 RX ORDER — METHYLPREDNISOLONE 4 MG/1
TABLET ORAL
Qty: 21 EACH | Refills: 0 | Status: SHIPPED | OUTPATIENT
Start: 2024-05-15

## 2024-05-15 NOTE — PROGRESS NOTES
"Ambulatory Visit  Name: Adrianna Becerra      : 1995      MRN: [id number]  Encounter Provider: Laura Sloan PA-C  Encounter Date: 5/15/2024   Encounter department: [facility]    Assessment & Plan   1. Poison ivy dermatitis  Assessment & Plan:  Patient with possible poison ivy on left and right forearm, suspect possible eczema flare as well. Will treat with Medrol dose pack to help with inflammation that is leading to her pruritus. Recommend Kenalog cream continuation. Strongly recommended dermatology evaluation, which patient is agreeable with. Recommend sooner follow up if rash does not improve.   Orders:  -     methylPREDNISolone 4 MG tablet therapy pack; Use as directed on package  2. Eczema, unspecified type  Assessment & Plan:  Rash present on exam today consistent with possible poison ivy dermatitis (recent exposure) vs eczema flare up. Recommend dermatology evaluation for eczema, given resources for dermatology. Recommend emollients on problem areas as well.   Orders:  -     triamcinolone (KENALOG) 0.1 % cream; Apply topically 2 (two) times a day  -     methylPREDNISolone 4 MG tablet therapy pack; Use as directed on package  -     Ambulatory Referral to Dermatology; Future       History of Present Illness     Patient presents for evaluation of rash that has been present for the past few days on her bilateral forearms. The rash is pruritic. She was exposed to poison ivy recently. She also believes it could be related to her eczema.         Review of Systems   Skin:  Positive for rash.     Pertinent Medical History   Eczema    Objective     /70 (BP Location: Left arm, Patient Position: Sitting, Cuff Size: Standard)   Pulse 101   Temp 98.8 °F (37.1 °C)   Ht 5' 2.21\" (1.58 m)   Wt 78 kg (172 lb)   LMP 2024 (Exact Date)   SpO2 99%   BMI 31.25 kg/m²     Physical Exam  Vitals and nursing note reviewed.   Constitutional:       General: She is not in acute distress.   "   Appearance: Normal appearance. She is not ill-appearing.   HENT:      Head: Normocephalic and atraumatic.   Pulmonary:      Effort: Pulmonary effort is normal. No respiratory distress.   Skin:     Coloration: Skin is not cyanotic or pale.             Comments: Red pruritic rash with small vesicles, consistent with poison ivy. Red eczematous rash on bilateral wrists, upper and lower back.    Neurological:      General: No focal deficit present.      Mental Status: She is alert and oriented to person, place, and time.   Psychiatric:         Mood and Affect: Mood normal.         Behavior: Behavior normal.         Judgment: Judgment normal.       Administrative Statements

## 2024-05-15 NOTE — ASSESSMENT & PLAN NOTE
Rash present on exam today consistent with possible poison ivy dermatitis (recent exposure) vs eczema flare up. Recommend dermatology evaluation for eczema, given resources for dermatology. Recommend emollients on problem areas as well.

## 2024-05-15 NOTE — ASSESSMENT & PLAN NOTE
Patient with possible poison ivy on left and right forearm, suspect possible eczema flare as well. Will treat with Medrol dose pack to help with inflammation that is leading to her pruritus. Recommend Kenalog cream continuation. Strongly recommended dermatology evaluation, which patient is agreeable with. Recommend sooner follow up if rash does not improve.

## 2024-05-19 PROBLEM — H10.13 ALLERGIC CONJUNCTIVITIS OF BOTH EYES: Status: RESOLVED | Noted: 2024-04-19 | Resolved: 2024-05-19

## 2024-05-22 ENCOUNTER — OFFICE VISIT (OUTPATIENT)
Dept: PODIATRY | Facility: CLINIC | Age: 29
End: 2024-05-22
Payer: COMMERCIAL

## 2024-05-22 DIAGNOSIS — M76.822 POSTERIOR TIBIALIS TENDINITIS OF BOTH LOWER EXTREMITIES: Primary | ICD-10-CM

## 2024-05-22 DIAGNOSIS — M21.41 FLAT FEET: ICD-10-CM

## 2024-05-22 DIAGNOSIS — M21.42 FLAT FEET: ICD-10-CM

## 2024-05-22 DIAGNOSIS — M76.821 POSTERIOR TIBIALIS TENDINITIS OF BOTH LOWER EXTREMITIES: Primary | ICD-10-CM

## 2024-05-22 PROCEDURE — 99202 OFFICE O/P NEW SF 15 MIN: CPT | Performed by: PODIATRIST

## 2024-05-22 RX ORDER — MELOXICAM 15 MG/1
15 TABLET ORAL DAILY
Qty: 30 TABLET | Refills: 1 | Status: SHIPPED | OUTPATIENT
Start: 2024-05-22 | End: 2024-07-21

## 2024-05-22 NOTE — PROGRESS NOTES
Ambulatory Visit  Name: Adrianna Becerra      : 1995      MRN: [id number]  Encounter Provider: Dieter Ralph DPM  Encounter Date: 2024   Encounter department: [facility]    Explained to patient that she is dealing with a bilateral flatfoot deformity along with hyperpronation that is leading to her bilateral foot pain.  Pain along the posterior tibial tendon is present both feet consistent with posterior tibial tendinitis.  The patient was referred to [facility] physical therapy for functional orthotics to control the pronation.  In the interim, she was placed on meloxicam 15 mg daily for 30 days with 1 refill.    Assessment & Plan   1. Posterior tibialis tendinitis of both lower extremities  -     meloxicam (Mobic) 15 mg tablet; Take 1 tablet (15 mg total) by mouth daily  -     Ambulatory referral to Physical Therapy; Future  2. Flat feet  -     Ambulatory Referral to Podiatry  -     Ambulatory referral to Physical Therapy; Future      History of Present Illness     Adrianna Becerra is a 29 y.o. female who presents with a bilateral flatfoot deformity exacerbated by hyperpronation.  The patient notes that she is employed as a .  After an extended workday she has pain along the inner aspect of each foot.  She has been using CBD oil due to discomfort.  She is inquiring about custom orthotics.    Review of Systems   Cardiovascular: Negative.    Gastrointestinal: Negative.    Musculoskeletal:  Positive for gait problem.   Psychiatric/Behavioral: Negative.             Objective     LMP 2024 (Exact Date)     Physical Exam  Constitutional:       Appearance: Normal appearance.   Cardiovascular:      Pulses: Normal pulses.   Musculoskeletal:         General: Deformity present.      Comments: Pes planus foot type bilateral with hyperpronation.  Good range of motion at the subtalar joint.  Mild discomfort with palpation along posterior tibial tendon bilateral.   Skin:     General: Skin is  warm.   Neurological:      General: No focal deficit present.      Mental Status: She is oriented to person, place, and time.           Administrative Statements

## 2024-05-23 DIAGNOSIS — L73.9 FOLLICULITIS: ICD-10-CM

## 2024-05-23 DIAGNOSIS — L30.9 ECZEMA, UNSPECIFIED TYPE: ICD-10-CM

## 2024-05-24 RX ORDER — TRIAMCINOLONE ACETONIDE 1 MG/G
CREAM TOPICAL 2 TIMES DAILY
Qty: 45 G | Refills: 0 | OUTPATIENT
Start: 2024-05-24

## 2024-06-05 ENCOUNTER — EVALUATION (OUTPATIENT)
Dept: PHYSICAL THERAPY | Facility: OTHER | Age: 29
End: 2024-06-05
Payer: COMMERCIAL

## 2024-06-05 DIAGNOSIS — M76.822 POSTERIOR TIBIALIS TENDINITIS OF BOTH LOWER EXTREMITIES: ICD-10-CM

## 2024-06-05 DIAGNOSIS — M21.42 FLAT FEET: ICD-10-CM

## 2024-06-05 DIAGNOSIS — M21.41 FLAT FEET: ICD-10-CM

## 2024-06-05 DIAGNOSIS — M76.821 POSTERIOR TIBIALIS TENDINITIS OF BOTH LOWER EXTREMITIES: ICD-10-CM

## 2024-06-05 PROCEDURE — 97760 ORTHOTIC MGMT&TRAING 1ST ENC: CPT

## 2024-06-05 NOTE — PROGRESS NOTES
"Orthotic Evaluation    Today's date: 24  Patient name: Adrianna Becerra  : 1995  MRN: [id number]  Referring provider: Dieter Ralph DPM  Dx:   Encounter Diagnosis     ICD-10-CM    1. Flat feet  M21.41 Ambulatory referral to Physical Therapy    M21.42       2. Posterior tibialis tendinitis of both lower extremities  M76.821 Ambulatory referral to Physical Therapy    M76.822           Start Time: 1039  Stop Time: 1123  Total time in clinic (min): 44 minutes     Subjective:    The following subjective from encounter with referring provider on 24 and confirmed with patient: \"Adrianna Becerra is a 29 y.o. female who presents with a bilateral flatfoot deformity exacerbated by hyperpronation.  The patient notes that she is employed as a .  After an extended workday she has pain along the inner aspect of each foot.  She has been using CBD oil due to discomfort.  She is inquiring about custom orthotics.\"    Presents today with the following: Adrianna presents today for orthotic evaluation. she complains of pain, balance dysfunction, ambulatory dysfunction, and postural dysfunction with functional activities including ambulation, athletics, and work. The patient plans to use her orthotic for walking, standing, and work. The orthotic will be placed in a standard, size 8.    Objective:    Age: 29 y.o.  Weight: 160    Foot Posture Index Score    Right Foot  Talar dome - +1  Malleolar curve - +2   Calcaneal eversion - +1  Talonavicular bulge - +1  Medial longitudinal arch - +2  Too many toes - +1  Total Score R = 8    Left Foot  Talar dome - +1  Malleolar curve - +2   Calcaneal eversion - +2  Talonavicular bulge - +1  Medial longitudinal arch - +2  Too many toes - +2  Total Score L = 10    Reference Values  Normal =    0 to +5  Pronated =  +6 to +9 Highly Pronated =  10+  Supinated =   -1 to -4 Highly Supinated = -5 to -12    Objective    Good mobility foot / ankle  TTP posterior tib tendon distally into " navicular  (+) Accessory navicular bilat  (+) Gastroc / soleus tightness  Gait reveals early excessive pronation, medial heel whip, decreased dorsiflexion with early heel rise and B foot ER    Assessment:    Patient requires custom foot orthosis with medial forefoot / rearfoot posting, medial clip, deepened heel cup, and poron back-filled navicular to correct altered gait mechanics / foot pain. Patient is not currently controlled by her current shoe wear. Patient was educated on the design of the custom orthotic and it's ability to offload her current pathology. Patient was also educated on potential shoe wear changes with device, break-in period, and skin checks to avoid potential skin break down.     Orthotic goals:  1) Patient will have custom foot orthoses fitted to her shoe.   2) Patient will be compliant with break-in period of custom foot orthoses as prescribed by PT.   3) Patient will be compliant with custom foot orthoses use as prescribed by PT.     Plan:    Planned therapy interventions: orthotic fitting/training  Duration in visits: 2

## 2024-06-10 ENCOUNTER — EVALUATION (OUTPATIENT)
Dept: PHYSICAL THERAPY | Facility: OTHER | Age: 29
End: 2024-06-10
Payer: COMMERCIAL

## 2024-06-10 DIAGNOSIS — M21.41 FLAT FEET: Primary | ICD-10-CM

## 2024-06-10 DIAGNOSIS — M76.821 POSTERIOR TIBIALIS TENDINITIS OF BOTH LOWER EXTREMITIES: ICD-10-CM

## 2024-06-10 DIAGNOSIS — M21.42 FLAT FEET: Primary | ICD-10-CM

## 2024-06-10 DIAGNOSIS — M76.822 POSTERIOR TIBIALIS TENDINITIS OF BOTH LOWER EXTREMITIES: ICD-10-CM

## 2024-06-10 PROCEDURE — 97110 THERAPEUTIC EXERCISES: CPT

## 2024-06-10 PROCEDURE — 97161 PT EVAL LOW COMPLEX 20 MIN: CPT

## 2024-06-10 NOTE — PROGRESS NOTES
"PT Evaluation     Today's date: 6/10/2024  Patient name: Adrianna Becerra  : 1995  MRN: [id number]  Referring provider: Dieter Ralph DPM  Dx:   Encounter Diagnosis     ICD-10-CM    1. Flat feet  M21.41     M21.42       2. Posterior tibialis tendinitis of both lower extremities  M76.821     M76.822           Start Time: 1211  Stop Time: 1310  Total time in clinic (min): 59 minutes    Assessment  Impairments: abnormal gait, abnormal or restricted ROM, activity intolerance, impaired balance, impaired physical strength, lacks appropriate home exercise program, pain with function, poor posture  and poor body mechanics    Assessment details: Adrianna Becerra is a 29 y.o. female presenting to OPPT initial evaluation with chief complaint of B foot pain for the last few years of increasing intenstiy. Reports she rolled her L ankle approx 4 years ago. Pain is described as anteromedial L ankle pain as well as into the arch of the L foot, \"6/10,\" \"achy\" constantly aggravated by prolonged standing. Feels walking is helpful, get alleviation from medical ointments and rolling the foot. Denies sleep disturbances. Denies numbness / tingling.   Presents with decreased foot / ankle mobility, calf tightness, impaired balance and decreased foot intrinsic control impacting control of gait and resulting in overuse of plantar fascia. Pt given toe yoga and calf stretch for HEP; recommended toe spacers. The patient is a good candidate for physical therapy to achieve the following goals.        Goals  STG (3 weeks):  Pt will decrease pain to <3/10 at the end of the day in order to improve tolerance to full work shift.  Pt will demonstrate increased B ankle DF ROM to 12 deg   Pt will be able to balance SL EO for 30 sec bilat  Pt's self-perceived disability will decrease as demonstrated by a >5-point improvement in FOTO score.    Pt will be independent with HEP as demonstrated by return demo with proper technique and execution of exercises " "provided.     LTG (6 weeks):  Pt will have absence of pain for 2 consecutive sessions in order to facilitate return to running.  Pt will demonstrate increased B ankle DF ROM to >17 deg   Pt will be able to balance SL EC for 30 sec bilat  Pt's self-perceived disability will decrease as demonstrated by a FOTO score >80.  Pt will be independent with progressions to HEP as demonstrated by return demo with proper technique and execution of exercises provided.       Plan  Patient would benefit from: skilled physical therapy and PT eval  Planned modality interventions: biofeedback, electrical stimulation/Russian stimulation, TENS, thermotherapy: hydrocollator packs, traction, ultrasound and cryotherapy    Planned therapy interventions: abdominal trunk stabilization, activity modification, balance, balance/weight bearing training, body mechanics training, flexibility, functional ROM exercises, graded activity, graded exercise, graded motor, gait training, home exercise program, therapeutic training, therapeutic activities, therapeutic exercise, stretching, strengthening, joint mobilization, manual therapy, massage, neuromuscular re-education, patient education, postural training, IASTM and kinesiology taping    Frequency: 1x week  Duration in weeks: 6  Plan of Care beginning date: 6/10/2024  Plan of Care expiration date: 7/25/2024  Treatment plan discussed with: patient        Subjective Evaluation    History of Present Illness  Mechanism of injury: Adrianna Becerra is a 29 y.o. female presenting to OPPT initial evaluation with chief complaint of B foot pain for the last few years of increasing intenstiy. Reports she rolled her L ankle approx 4 years ago. Pain is described as anteromedial L ankle pain as well as into the arch of the L foot, \"6/10,\" \"achy\" constantly aggravated by prolonged standing. Feels walking is helpful, get alleviation from medical ointments and rolling the foot. Denies sleep disturbances. Denies numbness / " tingling.     No previous treatment for primary complaint.          Patient Goals  Patient goals for therapy: decreased pain, increased motion, improved balance, return to sport/leisure activities, increased strength and return to work  Patient goal: Return to running  Pain  Current pain ratin  At best pain ratin  At worst pain ratin  Quality: dull ache      Diagnostic Tests  No diagnostic tests performed  Treatments  No previous or current treatments        Objective      Observation: (+) Accessory navicular bilat; B pes planus (see FPI)    Foot Posture Index Score     Right Foot  Talar dome - +1  Malleolar curve - +2   Calcaneal eversion - +1  Talonavicular bulge - +1  Medial longitudinal arch - +2  Too many toes - +1  Total Score R = 8     Left Foot  Talar dome - +1  Malleolar curve - +2   Calcaneal eversion - +2  Talonavicular bulge - +1  Medial longitudinal arch - +2  Too many toes - +2  Total Score L = 10     Reference Values  Normal =           0 to +5  Pronated =      +6 to +9           Highly Pronated =       10+  Supinated =      -1 to -4            Highly Supinated =      -5 to -12    Palpation: TTP posterior tib tendon distally into navicular    Range of motion:    R ankle DF ke 0  L ankle DF ke 10  Decreased GT ext B  (+) Gastroc / soleus tightness B    Articular joint mobility:  Ankle posterior glide: hypomobile bilat; p! L  Ankle anterior glide; WNL   1st MTP extension: decreased R>L; decreased with ankle DF 2/2 to tight plantar fasc  Intertarsal glides: WNL; p! L    Strength:   Left Right   Ankle DF 5/5 5/5   Ankle Pronation 4+/5 4+/5   Ankle Supination 4+/5 4+/5   Ankle PF (SL Calf R) 4/5 4/5   Great toe ext 4+/5 4+/5     Functional mobility assessment:  Single-leg stance balance EO: 13 sec R, 10 sec L - increased sway LLE         Precautions: none    POC expires Unit limit Auth Expiration date PT/OT + Visit Limit?   24 4 N/a N/a                           Visit/Unit Tracking  AUTH  Status:  Date 6/10              Yes Used 1               Remaining                      Visit #: 1 IE          Date: 6/10          Manuals           EPAT: plantar fasciitis 1.4 bars, 21 Hz, 2000 pulses:L     SFP Address post tib                    Subtalar med/lat glide SFP          TCJ  post glide SFP          GT distract + ext SFP                       Neuro Re-Ed            Short foot HEP          Toe yoga HEP          Eccentric calf raise           Post tib calf raise           GT flex TB resist           Lat 4 toe flex TB resist                         Ther Ex            Bike            Towel stretch  HEP          Calf S on slant board  X1'ea, ke/kf          Passive toe flex/ext HEP                                                              Ther Activity                                      Gait Training                                      Modalities

## 2024-06-19 ENCOUNTER — APPOINTMENT (OUTPATIENT)
Dept: PHYSICAL THERAPY | Facility: OTHER | Age: 29
End: 2024-06-19
Payer: COMMERCIAL

## 2024-06-19 NOTE — PROGRESS NOTES
Daily Note     Today's date: 2024  Patient name: Adrianna Becerra  : 1995  MRN: [id number]  Referring provider: Dieter Ralph DPM  Dx: No diagnosis found.               Subjective: ***      Objective: See treatment diary below      Assessment: Tolerated treatment {Tolerated treatment :}. Patient {assessment:[id number]}      Plan: {PLAN:}     Precautions: none    POC expires Unit limit Auth Expiration date PT/OT + Visit Limit?   24 4 N/a N/a                           Visit/Unit Tracking  AUTH Status:  Date 6/10              Yes Used 1               Remaining                      Visit #: 1 IE          Date: 6/10          Manuals           EPAT: plantar fasciitis 1.4 bars, 21 Hz, 2000 pulses:L     SFP Address post tib                    Subtalar med/lat glide SFP          TCJ  post glide SFP          GT distract + ext SFP                       Neuro Re-Ed            Short foot HEP          Toe yoga HEP          Eccentric calf raise           Post tib calf raise           GT flex TB resist           Lat 4 toe flex TB resist                         Ther Ex            Bike            Towel stretch  HEP          Calf S on slant board  X1'ea, ke/kf          Passive toe flex/ext HEP                                                              Ther Activity                                      Gait Training                                      Modalities

## 2024-06-26 ENCOUNTER — OFFICE VISIT (OUTPATIENT)
Dept: PHYSICAL THERAPY | Facility: OTHER | Age: 29
End: 2024-06-26
Payer: COMMERCIAL

## 2024-06-26 DIAGNOSIS — M21.42 FLAT FEET: ICD-10-CM

## 2024-06-26 DIAGNOSIS — M76.822 POSTERIOR TIBIALIS TENDINITIS OF BOTH LOWER EXTREMITIES: Primary | ICD-10-CM

## 2024-06-26 DIAGNOSIS — M76.821 POSTERIOR TIBIALIS TENDINITIS OF BOTH LOWER EXTREMITIES: Primary | ICD-10-CM

## 2024-06-26 DIAGNOSIS — M21.41 FLAT FEET: ICD-10-CM

## 2024-06-26 PROCEDURE — 97140 MANUAL THERAPY 1/> REGIONS: CPT

## 2024-06-26 PROCEDURE — 97110 THERAPEUTIC EXERCISES: CPT

## 2024-06-26 PROCEDURE — L3010 FOOT LONGITUDINAL ARCH SUPPO: HCPCS

## 2024-06-26 NOTE — PROGRESS NOTES
"Daily Note     Today's date: 2024  Patient name: Adrianna Becerra  : 1995  MRN: [id number]  Referring provider: Dieter Ralph DPM  Dx:   Encounter Diagnosis     ICD-10-CM    1. Posterior tibialis tendinitis of both lower extremities  M76.821     M76.822       2. Flat feet  M21.41     M21.42           Start Time: 1355  Stop Time: 1433  Total time in clinic (min): 38 minutes    Subjective: Pt reports she has began running a little, no issues.      Objective: See treatment diary below      Assessment: Tolerated treatment well. Session focused on plantar fascia / posterior tib myofascial restrictions and progressive foot / ankle strengthening. Orthotics dispensed today. Patient would benefit from continued PT      Plan: Continue per plan of care.      Precautions: none    POC expires Unit limit Auth Expiration date PT/OT + Visit Limit?   24 4 N/a N/a                           Visit/Unit Tracking  AUTH Status:  Date 6/10 6/26             Yes Used 1 2              Remaining                      Visit #: 1 IE 2         Date: 6/10 6/26         Manuals           EPAT: plantar fasciitis 1.4 bars, 21 Hz, 2000 pulses:L     SFP 2.0 bars, 21 Hz, 3000 pls:B    SFP                    Subtalar med/lat glide SFP          TCJ  post glide SFP          GT distract + ext SFP                       Neuro Re-Ed            Short foot HEP          Toe yoga HEP          S/L Inversion  2# 3x15         Eccentric calf raise  Leg press 50# 3x10-15         Post tib calf raise  Grn 3x15         GT flex TB resist           Lat 4 toe flex TB resist                         SLS  3x30\"                    Ther Ex            Bike            Towel stretch  HEP          Calf S on slant board  X1'ea, ke/kf X1'ea ke/kf         Passive toe flex/ext HEP                                                              Ther Activity                                      Gait Training                                      Modalities                       "

## 2024-06-26 NOTE — PROGRESS NOTES
Custom orthotics fitted to patient's shoe and dispensed. Pt demonstrating gait improvements with orthotic device. Patient educated on appropriate break-in period. Patient will follow up if any future problems arise.

## 2024-07-03 ENCOUNTER — OFFICE VISIT (OUTPATIENT)
Dept: PHYSICAL THERAPY | Facility: OTHER | Age: 29
End: 2024-07-03
Payer: COMMERCIAL

## 2024-07-03 DIAGNOSIS — M76.822 POSTERIOR TIBIALIS TENDINITIS OF BOTH LOWER EXTREMITIES: Primary | ICD-10-CM

## 2024-07-03 DIAGNOSIS — M76.821 POSTERIOR TIBIALIS TENDINITIS OF BOTH LOWER EXTREMITIES: Primary | ICD-10-CM

## 2024-07-03 DIAGNOSIS — M21.41 FLAT FEET: ICD-10-CM

## 2024-07-03 DIAGNOSIS — M21.42 FLAT FEET: ICD-10-CM

## 2024-07-03 PROCEDURE — 97110 THERAPEUTIC EXERCISES: CPT

## 2024-07-03 PROCEDURE — 97140 MANUAL THERAPY 1/> REGIONS: CPT

## 2024-07-03 NOTE — PROGRESS NOTES
"Daily Note     Today's date: 7/3/2024  Patient name: Adrianna Becerra  : 1995  MRN: [id number]  Referring provider: Dieter Ralph DPM  Dx:   Encounter Diagnosis     ICD-10-CM    1. Posterior tibialis tendinitis of both lower extremities  M76.821     M76.822       2. Flat feet  M21.41     M21.42             Start Time: 1116  Stop Time: 1215  Total time in clinic (min): 59 minutes    Subjective: Pt reports a significant decrease in pain, able to stand at work for full day without pain.       Objective: See treatment diary below      Assessment: Tolerated treatment well. Session focused on posterior tib myofascial restrictions and progressive foot / ankle strengthening. Patient would benefit from continued PT      Plan: Continue per plan of care.      Precautions: none    POC expires Unit limit Auth Expiration date PT/OT + Visit Limit?   24 4 N/a N/a                           Visit/Unit Tracking  AUTH Status:  Date 6/10 6/26 7/3            Yes Used 1 2 3             Remaining                      Visit #: 1 IE 2 3        Date: 6/10 6/26 7/3        Manuals           EPAT: plantar fasciitis 1.4 bars, 21 Hz, 2000 pulses:L     SFP 2.0 bars, 21 Hz, 3000 pls:B    SFP 2.5 bars, 21 Hz, 3000 pls:B     SFP                   Subtalar med/lat glide SFP          TCJ  post glide SFP          GT distract + ext SFP                       Neuro Re-Ed            Short foot HEP          Toe yoga HEP          S/L Inversion  2# 3x15         Eccentric calf raise  Leg press 50# 3x10-15 Standing 2:1 3x10        Post tib calf raise  Grn 3x15 Grn x30ea        TB inv/ev   GTB x30ea        GT flex TB resist   GTB x30ea        Lat 4 toe flex TB resist                         SLS  3x30\"                    Ther Ex            Bike            Towel stretch  HEP          Calf S on slant board  X1'ea, ke/kf X1'ea ke/kf X1'ea kf/ke        Passive toe flex/ext HEP                                                              Ther Activity          "                             Gait Training                                      Modalities

## 2024-07-05 DIAGNOSIS — J45.20 MILD INTERMITTENT ASTHMA WITHOUT COMPLICATION: ICD-10-CM

## 2024-07-05 DIAGNOSIS — F41.9 ANXIETY: ICD-10-CM

## 2024-07-05 RX ORDER — HYDROXYZINE HYDROCHLORIDE 25 MG/1
25 TABLET, FILM COATED ORAL EVERY 6 HOURS PRN
Qty: 60 TABLET | Refills: 2 | Status: SHIPPED | OUTPATIENT
Start: 2024-07-05

## 2024-07-05 RX ORDER — ALBUTEROL SULFATE 90 UG/1
2 AEROSOL, METERED RESPIRATORY (INHALATION) EVERY 6 HOURS PRN
Qty: 18 G | Refills: 3 | Status: SHIPPED | OUTPATIENT
Start: 2024-07-05

## 2024-07-10 ENCOUNTER — OFFICE VISIT (OUTPATIENT)
Dept: PHYSICAL THERAPY | Facility: OTHER | Age: 29
End: 2024-07-10
Payer: COMMERCIAL

## 2024-07-10 DIAGNOSIS — M76.821 POSTERIOR TIBIALIS TENDINITIS OF BOTH LOWER EXTREMITIES: Primary | ICD-10-CM

## 2024-07-10 DIAGNOSIS — M76.822 POSTERIOR TIBIALIS TENDINITIS OF BOTH LOWER EXTREMITIES: Primary | ICD-10-CM

## 2024-07-10 DIAGNOSIS — M21.42 FLAT FEET: ICD-10-CM

## 2024-07-10 DIAGNOSIS — M21.41 FLAT FEET: ICD-10-CM

## 2024-07-10 PROCEDURE — 97140 MANUAL THERAPY 1/> REGIONS: CPT

## 2024-07-10 PROCEDURE — 97110 THERAPEUTIC EXERCISES: CPT

## 2024-07-10 NOTE — PROGRESS NOTES
"Daily Note     Today's date: 7/10/2024  Patient name: Adrianna Becerra  : 1995  MRN: [id number]  Referring provider: Dieter Ralph DPM  Dx:   Encounter Diagnosis     ICD-10-CM    1. Posterior tibialis tendinitis of both lower extremities  M76.821     M76.822       2. Flat feet  M21.41     M21.42               Start Time: 1137  Stop Time: 1215  Total time in clinic (min): 38 minutes    Subjective: Pt reports an improvements in L posterior tib pain, feels \"like feet are crampy possibly due to orthotics.\" Has not fully broken orthotics into a full 8-hr day yet.       Objective: See treatment diary below      Assessment: Tolerated treatment well. Session focused on posterior tib myofascial restrictions and progressive foot / ankle strengthening.  Added tank push/pull with good tolerance. Patient would benefit from continued PT      Plan: Continue per plan of care.      Precautions: none    POC expires Unit limit Auth Expiration date PT/OT + Visit Limit?   24 4 N/a N/a                           Visit/Unit Tracking  AUTH Status:  Date 6/10 6/26 7/3 7/10           Yes Used 1 2 3 4            Remaining                      Visit #: 1 IE 2 3 4       Date: 6/10 6/26 7/3 7/10       Manuals           EPAT: plantar fasciitis 1.4 bars, 21 Hz, 2000 pulses:L     SFP 2.0 bars, 21 Hz, 3000 pls:B    SFP 2.5 bars, 21 Hz, 3000 pls:B     SFP 3.0 bars, 21 Hz, 2000 pls:L    SFP                  Subtalar med/lat glide SFP          TCJ  post glide SFP          GT distract + ext SFP                       Neuro Re-Ed            Short foot HEP          Toe yoga HEP          S/L Inversion  2# 3x15         Eccentric calf raise  Leg press 50# 3x10-15 Standing 2:1 3x10 2:1 2x15ea       Post tib calf raise  Grn 3x15 Grn x30ea Yellow x30       TB inv/ev   GTB x30ea BTB Inv 3x12ea       GT flex TB resist   GTB x30ea OTB x30ea       Lat 4 toe flex TB resist                         SLS  3x30\"                    Ther Ex            Bike          " "  Towel stretch  HEP          Calf S on slant board  X1'ea, ke/kf X1'ea ke/kf X1'ea kf/ke X1'ea kf/ke       Passive toe flex/ext HEP   3x30\"                     Tank push / pull     Lvl2 8x20'                                 Ther Activity                                      Gait Training                                      Modalities                                                   "

## 2024-07-12 DIAGNOSIS — F41.9 ANXIETY: ICD-10-CM

## 2024-07-12 RX ORDER — HYDROXYZINE HYDROCHLORIDE 25 MG/1
25 TABLET, FILM COATED ORAL EVERY 6 HOURS PRN
Qty: 360 TABLET | Refills: 1 | Status: SHIPPED | OUTPATIENT
Start: 2024-07-12

## 2024-07-17 ENCOUNTER — APPOINTMENT (OUTPATIENT)
Dept: PHYSICAL THERAPY | Facility: OTHER | Age: 29
End: 2024-07-17
Payer: COMMERCIAL

## 2024-07-24 ENCOUNTER — OFFICE VISIT (OUTPATIENT)
Dept: PHYSICAL THERAPY | Facility: OTHER | Age: 29
End: 2024-07-24
Payer: COMMERCIAL

## 2024-07-24 DIAGNOSIS — M76.822 POSTERIOR TIBIALIS TENDINITIS OF BOTH LOWER EXTREMITIES: Primary | ICD-10-CM

## 2024-07-24 DIAGNOSIS — M21.42 FLAT FEET: ICD-10-CM

## 2024-07-24 DIAGNOSIS — M76.821 POSTERIOR TIBIALIS TENDINITIS OF BOTH LOWER EXTREMITIES: Primary | ICD-10-CM

## 2024-07-24 DIAGNOSIS — M21.41 FLAT FEET: ICD-10-CM

## 2024-07-24 PROCEDURE — 97110 THERAPEUTIC EXERCISES: CPT

## 2024-07-24 PROCEDURE — 97140 MANUAL THERAPY 1/> REGIONS: CPT

## 2024-07-24 NOTE — PROGRESS NOTES
"Daily Note     Today's date: 2024  Patient name: Adrianna Becerra  : 1995  MRN: [id number]  Referring provider: Dieter Ralph DPM  Dx:   Encounter Diagnosis     ICD-10-CM    1. Posterior tibialis tendinitis of both lower extremities  M76.821     M76.822       2. Flat feet  M21.41     M21.42                 Start Time: 1101  Stop Time: 1139  Total time in clinic (min): 38 minutes    Subjective: \"I can tell I missed last week because I'm feeling it more.\"      Objective: See treatment diary below      Assessment: Tolerated treatment well. Session focused on posterior tib myofascial restrictions and progressive foot / ankle strengthening. Added BOOST jogging today with absence of pain; pt would like to progress jogging for personal goals. Patient would benefit from continued PT      Plan: Continue per plan of care.      Precautions: none    POC expires Unit limit Auth Expiration date PT/OT + Visit Limit?   24 4 N/a N/a                           Visit/Unit Tracking  AUTH Status:  Date 6/10 6/26 7/3 7/10 7/24          Yes Used 1 2 3 4 5           Remaining                      Visit #: 1 IE 2 3 4 5      Date: 6/10 6/26 7/3 7/10 7/24      Manuals           EPAT: plantar fasciitis 1.4 bars, 21 Hz, 2000 pulses:L     SFP 2.0 bars, 21 Hz, 3000 pls:B    SFP 2.5 bars, 21 Hz, 3000 pls:B     SFP 3.0 bars, 21 Hz, 2000 pls:L    SFP 3.0 bars, 18 Hz, 2000 pls:L    SFP                 Subtalar med/lat glide SFP          TCJ  post glide SFP          GT distract + ext SFP                       Neuro Re-Ed            Short foot HEP          Toe yoga HEP          S/L Inversion  2# 3x15   Isos 4x45\"      Eccentric calf raise  Leg press 50# 3x10-15 Standing 2:1 3x10 2:1 2x15ea       Post tib calf raise  Grn 3x15 Grn x30ea Yellow x30 Yellow 3x15      TB inv/ev   GTB x30ea BTB Inv 3x12ea MTB Inv / Ev 3x15      GT flex TB resist   GTB x30ea OTB x30ea       Lat 4 toe flex TB resist                         SLS  3x30\"   Airex 3x30\" " "                Ther Ex            Bike            Towel stretch  HEP          Calf S on slant board  X1'ea, ke/kf X1'ea ke/kf X1'ea kf/ke X1'ea kf/ke X1'ea      Passive toe flex/ext HEP   3x30\"                     Tank push / pull     Lvl2 8x20'                                 Ther Activity                                      Gait Training             BOOST TM      65% BW, M, 45:45\" WJWJ x5                   Modalities                                                   "

## 2024-07-31 ENCOUNTER — OFFICE VISIT (OUTPATIENT)
Dept: PHYSICAL THERAPY | Facility: OTHER | Age: 29
End: 2024-07-31
Payer: COMMERCIAL

## 2024-07-31 DIAGNOSIS — M21.41 FLAT FEET: ICD-10-CM

## 2024-07-31 DIAGNOSIS — M21.42 FLAT FEET: ICD-10-CM

## 2024-07-31 DIAGNOSIS — M76.822 POSTERIOR TIBIALIS TENDINITIS OF BOTH LOWER EXTREMITIES: Primary | ICD-10-CM

## 2024-07-31 DIAGNOSIS — M76.821 POSTERIOR TIBIALIS TENDINITIS OF BOTH LOWER EXTREMITIES: Primary | ICD-10-CM

## 2024-07-31 PROCEDURE — 97140 MANUAL THERAPY 1/> REGIONS: CPT

## 2024-07-31 PROCEDURE — 97110 THERAPEUTIC EXERCISES: CPT

## 2024-07-31 NOTE — PROGRESS NOTES
"Daily Note     Today's date: 2024  Patient name: Adrianna Becerra  : 1995  MRN: [id number]  Referring provider: Dieter Ralph DPM  Dx:   Encounter Diagnosis     ICD-10-CM    1. Posterior tibialis tendinitis of both lower extremities  M76.821     M76.822       2. Flat feet  M21.41     M21.42                   Start Time: 0900  Stop Time: 09  Total time in clinic (min): 38 minutes    Subjective: Pt bought new shoes ([employer]) that she wore for a full day at work yesterday; notes some medial ankle pain today.      Objective: See treatment diary below      Assessment: Tolerated treatment well. Session focused on posterior tib myofascial restrictions, ankle mobility and progressive foot / ankle strengthening. Decreased pain in anterior ankle following TCJ distraction. Tolerated progressions to jogging / running in BOOST TM well with absence of pain. Patient would benefit from continued PT      Plan: Continue per plan of care.      Precautions: none    POC expires Unit limit Auth Expiration date PT/OT + Visit Limit?   24 4 N/a N/a                           Visit/Unit Tracking  AUTH Status:  Date 6/10 6/26 7/3 7/10 7/24 7/31         Yes Used 1 2 3 4 5 6          Remaining                      Visit #: 1 IE 2 3 4 5 6     Date: 6/10 6/26 7/3 7/10 7/24 7/31     Manuals           EPAT: plantar fasciitis 1.4 bars, 21 Hz, 2000 pulses:L     SFP 2.0 bars, 21 Hz, 3000 pls:B    SFP 2.5 bars, 21 Hz, 3000 pls:B     SFP 3.0 bars, 21 Hz, 2000 pls:L    SFP 3.0 bars, 18 Hz, 2000 pls:L    SFP 3.0 bars, 18 Hz, 2000 pls:L    SFP                Subtalar med/lat glide SFP          TCJ  post glide SFP     gV TCJ distraction     GT distract + ext SFP                       Neuro Re-Ed            Short foot HEP          Toe yoga HEP          S/L Inversion  2# 3x15   Isos 4x45\" Isos 5x40\"     Eccentric calf raise  Leg press 50# 3x10-15 Standing 2:1 3x10 2:1 2x15ea  BOOST 75% BW     SL HR 3x10ea     Post tib calf raise  Grn 3x15 " "Grn x30ea Yellow x30 Yellow 3x15      TB inv/ev   GTB x30ea BTB Inv 3x12ea MTB Inv / Ev 3x15      GT flex TB resist   GTB x30ea OTB x30ea       Lat 4 toe flex TB resist                         SLS  3x30\"   Airex 3x30\" UDKB 3x30\"                Ther Ex            Bike            Towel stretch  HEP          Calf S on slant board  X1'ea, ke/kf X1'ea ke/kf X1'ea kf/ke X1'ea kf/ke X1'ea 2X1'ea kf/ke     Passive toe flex/ext HEP   3x30\"                    Band ankle posteior glide mob      X2\" ea                 Tank push / pull     Lvl2 8x20'                                 Ther Activity                                      Gait Training             BOOST TM      65% BW, M, 45:45\" WJWJ x5 70% BW, M, 45:45\" WJWJ x5                  Modalities                                                   "

## 2024-08-05 ENCOUNTER — TELEPHONE (OUTPATIENT)
Age: 29
End: 2024-08-05

## 2024-08-05 NOTE — TELEPHONE ENCOUNTER
Patient called, request to reschedule PHY 08/05/2024. Patient questioned information regarding lab orders. Upon chart review appointments,lab orders. Scheduled patient for PHY 08/16/2024, conveyed lab order information and details.

## 2024-08-07 ENCOUNTER — OFFICE VISIT (OUTPATIENT)
Dept: PHYSICAL THERAPY | Facility: OTHER | Age: 29
End: 2024-08-07
Payer: COMMERCIAL

## 2024-08-07 DIAGNOSIS — M76.822 POSTERIOR TIBIALIS TENDINITIS OF BOTH LOWER EXTREMITIES: Primary | ICD-10-CM

## 2024-08-07 DIAGNOSIS — M21.42 FLAT FEET: ICD-10-CM

## 2024-08-07 DIAGNOSIS — M76.821 POSTERIOR TIBIALIS TENDINITIS OF BOTH LOWER EXTREMITIES: Primary | ICD-10-CM

## 2024-08-07 DIAGNOSIS — M21.41 FLAT FEET: ICD-10-CM

## 2024-08-07 PROCEDURE — 97140 MANUAL THERAPY 1/> REGIONS: CPT

## 2024-08-07 PROCEDURE — 97110 THERAPEUTIC EXERCISES: CPT

## 2024-08-07 NOTE — PROGRESS NOTES
"Daily Note     Today's date: 2024  Patient name: Adrianna Becerra  : 1995  MRN: [id number]  Referring provider: Dieter Ralph DPM  Dx:   Encounter Diagnosis     ICD-10-CM    1. Posterior tibialis tendinitis of both lower extremities  M76.821     M76.822       2. Flat feet  M21.41     M21.42                     Start Time: 1116  Stop Time: 1154  Total time in clinic (min): 38 minutes    Subjective: Pt notes she had off from work the last 3 days and has decrease in medial ankle pain; notes some plantar surface pain after walking around [place] in [employer].      Objective: See treatment diary below      Assessment: Tolerated treatment well. Session focused on plantar fascia / posterior tib myofascial restrictions, ankle mobility and progressive foot / ankle strengthening. Absence of pain today with progressions to both BW support & duration ran in BOOST TM. Patient would benefit from continued PT      Plan: Continue per plan of care.      Precautions: none    POC expires Unit limit Auth Expiration date PT/OT + Visit Limit?   24 4 N/a N/a                           Visit/Unit Tracking  AUTH Status:  Date 6/10 6/26 7/3 7/10 7/24 7/31 8/7        Yes Used 1 2 3 4 5 6 7         Remaining                      Visit #: 1 IE 2 3 4 5 6 7    Date: 6/10 6/26 7/3 7/10 7/24 7/31 8/7    Manuals           EPAT: plantar fasciitis 1.4 bars, 21 Hz, 2000 pulses:L     SFP 2.0 bars, 21 Hz, 3000 pls:B    SFP 2.5 bars, 21 Hz, 3000 pls:B     SFP 3.0 bars, 21 Hz, 2000 pls:L    SFP 3.0 bars, 18 Hz, 2000 pls:L    SFP 3.0 bars, 18 Hz, 2000 pls:L    SFP 2.5 bars, 21 Hz, 4000 pls: B plantar fasc & post tib    SFP               Subtalar med/lat glide SFP          TCJ  post glide SFP     gV TCJ distraction gV TCJ distraction    GT distract + ext SFP                       Neuro Re-Ed            Short foot HEP          Toe yoga HEP          S/L Inversion  2# 3x15   Isos 4x45\" Isos 5x40\" Isos 5x40\"    Eccentric calf raise  Leg press 50# 3x10-15 " "Standing 2:1 3x10 2:1 2x15ea  BOOST 75% BW     SL HR 3x10ea BOOST 80% 2:1  X30 ea    Post tib calf raise  Grn 3x15 Grn x30ea Yellow x30 Yellow 3x15      TB inv/ev   GTB x30ea BTB Inv 3x12ea MTB Inv / Ev 3x15      GT flex TB resist   GTB x30ea OTB x30ea       Lat 4 toe flex TB resist                         SLS  3x30\"   Airex 3x30\" UDKB 3x30\"                Ther Ex            Bike            Towel stretch  HEP          Calf S on slant board  X1'ea, ke/kf X1'ea ke/kf X1'ea kf/ke X1'ea kf/ke X1'ea 2X1'ea kf/ke     Passive toe flex/ext HEP   3x30\"                    Band ankle posteior glide mob      X2\" ea                 Tank push / pull     Lvl2 8x20'                                 Ther Activity                                      Gait Training             BOOST TM      65% BW, M, 45:45\" WJWJ x5 70% BW, M, 45:45\" WJWJ x5 80% BW, M, 60:90 WJWJ x5    1.0 miles total                 Modalities                                                   "

## 2024-08-14 ENCOUNTER — OFFICE VISIT (OUTPATIENT)
Dept: PHYSICAL THERAPY | Facility: OTHER | Age: 29
End: 2024-08-14
Payer: COMMERCIAL

## 2024-08-14 DIAGNOSIS — M76.822 POSTERIOR TIBIALIS TENDINITIS OF BOTH LOWER EXTREMITIES: Primary | ICD-10-CM

## 2024-08-14 DIAGNOSIS — M21.41 FLAT FEET: ICD-10-CM

## 2024-08-14 DIAGNOSIS — M21.42 FLAT FEET: ICD-10-CM

## 2024-08-14 DIAGNOSIS — M76.821 POSTERIOR TIBIALIS TENDINITIS OF BOTH LOWER EXTREMITIES: Primary | ICD-10-CM

## 2024-08-14 PROCEDURE — 97110 THERAPEUTIC EXERCISES: CPT

## 2024-08-14 PROCEDURE — 97140 MANUAL THERAPY 1/> REGIONS: CPT

## 2024-08-14 NOTE — PROGRESS NOTES
"Daily Note     Today's date: 2024  Patient name: Adrianna Becerra  : 1995  MRN: [id number]  Referring provider: Dieter Ralph DPM  Dx:   Encounter Diagnosis     ICD-10-CM    1. Posterior tibialis tendinitis of both lower extremities  M76.821     M76.822       2. Flat feet  M21.41     M21.42                 Start Time: 1038  Stop Time: 1116  Total time in clinic (min): 38 minutes    Subjective: \"My feet are pretty beat up from walking / standing at [facility] [place].\"      Objective: See treatment diary below      Assessment: Tolerated treatment well. Session focused on plantar fascia / posterior tib myofascial restrictions, ankle mobility and progressive foot / ankle strengthening. Noted pain relief following IASTM / EPAT to plantar fascia. Patient would benefit from continued PT      Plan: Continue per plan of care.      Precautions: none    POC expires Unit limit Auth Expiration date PT/OT + Visit Limit?   24 4 N/a N/a                           Visit/Unit Tracking  AUTH Status:  Date 6/10 6/26 7/3 7/10 7/24 7/31 8/7 8/14       Yes Used 1 2 3 4 5 6 7 8        Remaining                      Visit #: 1 IE 2 3 4 5 6 7 8   Date: 6/10 6/26 7/3 7/10 7/24 7/31 8/7 8/14   Manuals           EPAT: plantar fasciitis 1.4 bars, 21 Hz, 2000 pulses:L     SFP 2.0 bars, 21 Hz, 3000 pls:B    SFP 2.5 bars, 21 Hz, 3000 pls:B     SFP 3.0 bars, 21 Hz, 2000 pls:L    SFP 3.0 bars, 18 Hz, 2000 pls:L    SFP 3.0 bars, 18 Hz, 2000 pls:L    SFP 2.5 bars, 21 Hz, 4000 pls: B plantar fasc & post tib    SFP 2.5 bars, 21 Hz, 4000 pls: B plantar fasc & post tib    SFP              Subtalar med/lat glide SFP          TCJ  post glide SFP     gV TCJ distraction gV TCJ distraction    GT distract + ext SFP                       Neuro Re-Ed            Short foot HEP          Toe yoga HEP          S/L Inversion  2# 3x15   Isos 4x45\" Isos 5x40\" Isos 5x40\"    Eccentric calf raise  Leg press 50# 3x10-15 Standing 2:1 3x10 2:1 2x15ea  BOOST 75% BW     SL " "HR 3x10ea BOOST 80% 2:1  X30 ea SL OG 2x15ea   Post tib calf raise  Grn 3x15 Grn x30ea Yellow x30 Yellow 3x15   Yellow 3x15   TB inv/ev   GTB x30ea BTB Inv 3x12ea MTB Inv / Ev 3x15      GT flex TB resist   GTB x30ea OTB x30ea       Lat 4 toe flex TB resist                         SLS  3x30\"   Airex 3x30\" UDKB 3x30\"                Ther Ex            Bike            Towel stretch  HEP       3x30\"ea   Calf S on slant board  X1'ea, ke/kf X1'ea ke/kf X1'ea kf/ke X1'ea kf/ke X1'ea 2X1'ea kf/ke     Passive toe flex/ext HEP   3x30\"                    Band ankle posteior glide mob      X2\" ea                 Tank push / pull     Lvl2 8x20'                                 Ther Activity                                      Gait Training             BOOST TM      65% BW, M, 45:45\" WJWJ x5 70% BW, M, 45:45\" WJWJ x5 80% BW, M, 60:90 WJWJ x5    1.0 miles total 80% BW, M, 60:90 WJWJ x5    1.0 miles total                Modalities                                                   "

## 2024-08-16 ENCOUNTER — OFFICE VISIT (OUTPATIENT)
Dept: FAMILY MEDICINE CLINIC | Facility: CLINIC | Age: 29
End: 2024-08-16
Payer: COMMERCIAL

## 2024-08-16 ENCOUNTER — APPOINTMENT (OUTPATIENT)
Dept: LAB | Facility: CLINIC | Age: 29
End: 2024-08-16
Payer: COMMERCIAL

## 2024-08-16 VITALS
HEART RATE: 85 BPM | DIASTOLIC BLOOD PRESSURE: 60 MMHG | OXYGEN SATURATION: 99 % | TEMPERATURE: 97.9 F | HEIGHT: 62 IN | WEIGHT: 169.4 LBS | BODY MASS INDEX: 31.17 KG/M2 | SYSTOLIC BLOOD PRESSURE: 106 MMHG

## 2024-08-16 DIAGNOSIS — Z11.3 SCREEN FOR STD (SEXUALLY TRANSMITTED DISEASE): ICD-10-CM

## 2024-08-16 DIAGNOSIS — F41.9 ANXIETY: ICD-10-CM

## 2024-08-16 DIAGNOSIS — Z00.00 ADULT GENERAL MEDICAL EXAMINATION: Primary | ICD-10-CM

## 2024-08-16 DIAGNOSIS — L30.9 ECZEMA, UNSPECIFIED TYPE: ICD-10-CM

## 2024-08-16 DIAGNOSIS — Z13.220 SCREENING FOR LIPID DISORDERS: ICD-10-CM

## 2024-08-16 DIAGNOSIS — M21.41 FLAT FEET: ICD-10-CM

## 2024-08-16 DIAGNOSIS — Z13.29 SCREENING FOR THYROID DISORDER: ICD-10-CM

## 2024-08-16 DIAGNOSIS — L30.1 DYSHIDROTIC ECZEMA: ICD-10-CM

## 2024-08-16 DIAGNOSIS — Z13.0 SCREENING FOR DEFICIENCY ANEMIA: ICD-10-CM

## 2024-08-16 DIAGNOSIS — J30.9 ALLERGIC RHINITIS: ICD-10-CM

## 2024-08-16 DIAGNOSIS — M21.42 FLAT FEET: ICD-10-CM

## 2024-08-16 DIAGNOSIS — Z11.4 SCREENING FOR HIV (HUMAN IMMUNODEFICIENCY VIRUS): ICD-10-CM

## 2024-08-16 DIAGNOSIS — Z13.1 SCREENING FOR DIABETES MELLITUS: ICD-10-CM

## 2024-08-16 DIAGNOSIS — Z12.4 SCREENING FOR CERVICAL CANCER: ICD-10-CM

## 2024-08-16 DIAGNOSIS — Z11.59 NEED FOR HEPATITIS C SCREENING TEST: ICD-10-CM

## 2024-08-16 DIAGNOSIS — J45.20 MILD INTERMITTENT ASTHMA WITHOUT COMPLICATION: ICD-10-CM

## 2024-08-16 PROBLEM — L23.7 POISON IVY DERMATITIS: Status: RESOLVED | Noted: 2024-05-15 | Resolved: 2024-08-16

## 2024-08-16 LAB
ALBUMIN SERPL BCG-MCNC: 3.8 G/DL (ref 3.5–5)
ALP SERPL-CCNC: 38 U/L (ref 34–104)
ALT SERPL W P-5'-P-CCNC: 13 U/L (ref 7–52)
ANION GAP SERPL CALCULATED.3IONS-SCNC: 7 MMOL/L (ref 4–13)
AST SERPL W P-5'-P-CCNC: 14 U/L (ref 13–39)
BASOPHILS # BLD AUTO: 0.05 THOUSANDS/ÂΜL (ref 0–0.1)
BASOPHILS NFR BLD AUTO: 1 % (ref 0–1)
BILIRUB SERPL-MCNC: 0.39 MG/DL (ref 0.2–1)
BUN SERPL-MCNC: 12 MG/DL (ref 5–25)
CALCIUM SERPL-MCNC: 8.9 MG/DL (ref 8.4–10.2)
CHLORIDE SERPL-SCNC: 104 MMOL/L (ref 96–108)
CHOLEST SERPL-MCNC: 142 MG/DL
CO2 SERPL-SCNC: 28 MMOL/L (ref 21–32)
CREAT SERPL-MCNC: 0.63 MG/DL (ref 0.6–1.3)
EOSINOPHIL # BLD AUTO: 0.37 THOUSAND/ÂΜL (ref 0–0.61)
EOSINOPHIL NFR BLD AUTO: 7 % (ref 0–6)
ERYTHROCYTE [DISTWIDTH] IN BLOOD BY AUTOMATED COUNT: 12.8 % (ref 11.6–15.1)
GFR SERPL CREATININE-BSD FRML MDRD: 120 ML/MIN/1.73SQ M
GLUCOSE P FAST SERPL-MCNC: 85 MG/DL (ref 65–99)
HCT VFR BLD AUTO: 40.6 % (ref 34.8–46.1)
HCV AB SER QL: NORMAL
HDLC SERPL-MCNC: 48 MG/DL
HGB BLD-MCNC: 13.3 G/DL (ref 11.5–15.4)
HIV 1+2 AB+HIV1 P24 AG SERPL QL IA: NORMAL
HIV 2 AB SERPL QL IA: NORMAL
HIV1 AB SERPL QL IA: NORMAL
HIV1 P24 AG SERPL QL IA: NORMAL
IMM GRANULOCYTES # BLD AUTO: 0.03 THOUSAND/UL (ref 0–0.2)
IMM GRANULOCYTES NFR BLD AUTO: 1 % (ref 0–2)
LDLC SERPL CALC-MCNC: 80 MG/DL (ref 0–100)
LYMPHOCYTES # BLD AUTO: 1.62 THOUSANDS/ÂΜL (ref 0.6–4.47)
LYMPHOCYTES NFR BLD AUTO: 31 % (ref 14–44)
MCH RBC QN AUTO: 30.6 PG (ref 26.8–34.3)
MCHC RBC AUTO-ENTMCNC: 32.8 G/DL (ref 31.4–37.4)
MCV RBC AUTO: 94 FL (ref 82–98)
MONOCYTES # BLD AUTO: 0.39 THOUSAND/ÂΜL (ref 0.17–1.22)
MONOCYTES NFR BLD AUTO: 7 % (ref 4–12)
NEUTROPHILS # BLD AUTO: 2.78 THOUSANDS/ÂΜL (ref 1.85–7.62)
NEUTS SEG NFR BLD AUTO: 53 % (ref 43–75)
NRBC BLD AUTO-RTO: 0 /100 WBCS
PLATELET # BLD AUTO: 271 THOUSANDS/UL (ref 149–390)
PMV BLD AUTO: 10.5 FL (ref 8.9–12.7)
POTASSIUM SERPL-SCNC: 4.1 MMOL/L (ref 3.5–5.3)
PROT SERPL-MCNC: 6.5 G/DL (ref 6.4–8.4)
RBC # BLD AUTO: 4.34 MILLION/UL (ref 3.81–5.12)
SODIUM SERPL-SCNC: 139 MMOL/L (ref 135–147)
TREPONEMA PALLIDUM IGG+IGM AB [PRESENCE] IN SERUM OR PLASMA BY IMMUNOASSAY: NORMAL
TRIGL SERPL-MCNC: 71 MG/DL
TSH SERPL DL<=0.05 MIU/L-ACNC: 1.09 UIU/ML (ref 0.45–4.5)
WBC # BLD AUTO: 5.24 THOUSAND/UL (ref 4.31–10.16)

## 2024-08-16 PROCEDURE — 87389 HIV-1 AG W/HIV-1&-2 AB AG IA: CPT

## 2024-08-16 PROCEDURE — 86803 HEPATITIS C AB TEST: CPT

## 2024-08-16 PROCEDURE — 85025 COMPLETE CBC W/AUTO DIFF WBC: CPT

## 2024-08-16 PROCEDURE — 80053 COMPREHEN METABOLIC PANEL: CPT

## 2024-08-16 PROCEDURE — 80061 LIPID PANEL: CPT

## 2024-08-16 PROCEDURE — 84443 ASSAY THYROID STIM HORMONE: CPT

## 2024-08-16 PROCEDURE — 87491 CHLMYD TRACH DNA AMP PROBE: CPT

## 2024-08-16 PROCEDURE — 99395 PREV VISIT EST AGE 18-39: CPT

## 2024-08-16 PROCEDURE — 86780 TREPONEMA PALLIDUM: CPT

## 2024-08-16 PROCEDURE — 36415 COLL VENOUS BLD VENIPUNCTURE: CPT

## 2024-08-16 PROCEDURE — 99214 OFFICE O/P EST MOD 30 MIN: CPT

## 2024-08-16 PROCEDURE — 87591 N.GONORRHOEAE DNA AMP PROB: CPT

## 2024-08-16 RX ORDER — FLUTICASONE PROPIONATE 50 MCG
2 SPRAY, SUSPENSION (ML) NASAL DAILY
Qty: 48 ML | Refills: 1 | Status: SHIPPED | OUTPATIENT
Start: 2024-08-16

## 2024-08-16 RX ORDER — METHYLPREDNISOLONE 4 MG
TABLET, DOSE PACK ORAL
Qty: 21 EACH | Refills: 0 | Status: SHIPPED | OUTPATIENT
Start: 2024-08-16

## 2024-08-16 NOTE — PROGRESS NOTES
Adult Annual Physical  Name: Adrianna Becerra      : 1995      MRN: [id number]  Encounter Provider: Laura Sloan PA-C  Encounter Date: 2024   Encounter department: [facility]    Assessment & Plan   1. Adult general medical examination  Assessment & Plan:  Patient presents today for an annual physical. Patient's medical history and medication list were reviewed and updated. Annual physical questionnaire was given to review current diet, exercise level, sleep health, dental health, visual health, hearing status, and advanced care planning status.    Preventative health needs were reviewed and assessed today:   Immunizations:   Flu -out of season  COVID -no boosters  Tdap -up-to-date    HIV and hepatitis C screening to be completed with lab work.  I have placed an order for screening lab work several months ago, she is going to get this lab work completed today and I will reach out to her with results.    Due for updated Pap smear.  I believe she should also talk about possible birth control options to help with hormonal regulation prior to her menstrual cycle every month.  She is going to call for an appointment as soon as possible.    Patient otherwise has healthy habits, eats a healthy diet and exercises adequately.  Her physical examination today was unremarkable besides eczema flareup on bilateral arms.  Her vital signs are stable.    Diet/Exercise:  Recommend at least 150 minutes of moderate to vigorous cardiovascular exercise such as running, walking, biking, or swimming. Proper exercise should also include strength training 1-2 days/week to ensure good bone health and muscle tone.   Recommend balanced meals with attention to protein, fat and carbohydrate intake. Recommend decreasing sugars and saturated fats in the diet, and replacing these foods with healthier fruits and vegetables. Recommend whole grains and low-fat milk to ensure proper vitamin D and calcium intake. Avoid  added sugars and excess sodium. Pay attention to sugary drinks that may increase total calorie intake in a day, such as iced coffee, iced tea, and sodas. Recommended use of MyPlate.gov to determine proper daily values of macronutrients based on patient's height, weight and age.     Follow-up in about 6 months for regular check.  Sooner with any acute concerns.  2. Mild intermittent asthma without complication  Assessment & Plan:  Continue albuterol and Singulair.  Symptoms are stable at this time.  Recommend allergy testing to determine where eczema, asthma and her allergy symptoms are related to.  Orders:  -     Ambulatory Referral to Allergy; Future  3. Eczema, unspecified type  Assessment & Plan:  Currently having an eczema flareup on bilateral arms and ankles.  Recommend steroid taper as we have completed in the past.  Last taper was several months ago.  I have highly recommended she get in with dermatology and have given her names of dermatologist in the area to get in with as soon as possible.  Orders:  -     methylPREDNISolone 4 MG tablet therapy pack; Use as directed on package  -     Ambulatory Referral to Allergy; Future  4. Anxiety  Assessment & Plan:  Anxiety is stable at this time.  She takes hydroxyzine as needed.  She would still like to avoid taking a daily medication.  Patient is tearful when we were talking about her anxiety.  I have recommended she get back in with her therapist as a maintenance for her anxiety, which she is definitely going to look into.  She understands that we are here to help and she may come to me at anytime with any changes in her anxiety or if she reconsiders daily treatment in the future.  5. Flat feet  Assessment & Plan:  Currently undergoing physical therapy for her flatfeet and lower extremity concerns.  She states she is doing very well with physical therapy and is very happy with her progress there.  6. Dyshidrotic eczema  -     Ambulatory Referral to Allergy;  Future  7. Screening for cervical cancer  -     Ambulatory referral to Obstetrics / Gynecology; Future  8. Allergic rhinitis  -     fluticasone (FLONASE) 50 mcg/act nasal spray; 2 sprays into each nostril daily    Immunizations and preventive care screenings were discussed with patient today. Appropriate education was printed on patient's after visit summary.    Counseling:  Alcohol/drug use: discussed moderation in alcohol intake, the recommendations for healthy alcohol use, and avoidance of illicit drug use.  Dental Health: discussed importance of regular tooth brushing, flossing, and dental visits.  Injury prevention: discussed safety/seat belts, safety helmets, smoke detectors, carbon dioxide detectors, and smoking near bedding or upholstery.  Sexual health: discussed sexually transmitted diseases, partner selection, use of condoms, avoidance of unintended pregnancy, and contraceptive alternatives.  Exercise: the importance of regular exercise/physical activity was discussed. Recommend exercise 3-5 times per week for at least 30 minutes.          History of Present Illness     Adult Annual Physical:  Patient presents for annual physical. Presently having an eczema flare up for the past couple of weeks.  Using her normal steroid cream however it is not helping as much as it usually does..     Diet and Physical Activity:  - Diet/Nutrition: well balanced diet, consuming 3-5 servings of fruits/vegetables daily, adequate fiber intake and adequate whole grain intake.  - Exercise: moderate cardiovascular exercise, 30-60 minutes on average, 1-2 times a week on average and strength training exercises.    General Health:  - Sleep: sleeps well and 7-8 hours of sleep on average.  - Hearing: normal hearing bilateral ears.  - Vision: most recent eye exam > 1 year ago.  - Dental: no dental visits for > 1 year.    /GYN Health:  - Follows with GYN: yes.   - Menopause: premenopausal.   - Last menstrual cycle: 8/13/2024.   -  "History of STDs: no  - Contraception:. none      Review of Systems   Constitutional:  Negative for chills, fever and unexpected weight change.   Respiratory:  Negative for cough, chest tightness and shortness of breath.    Cardiovascular:  Negative for chest pain and palpitations.   Gastrointestinal:  Negative for abdominal pain, blood in stool and vomiting.   Genitourinary:  Negative for dysuria, hematuria and menstrual problem.   Musculoskeletal:  Negative for arthralgias, back pain and myalgias.   Skin:  Positive for rash.   Neurological:  Negative for dizziness, seizures, syncope and headaches.   Hematological:  Does not bruise/bleed easily.   Psychiatric/Behavioral:  Negative for behavioral problems and confusion.    All other systems reviewed and are negative.        Objective     /60   Pulse 85   Temp 97.9 °F (36.6 °C)   Ht 5' 1.81\" (1.57 m)   Wt 76.8 kg (169 lb 6.4 oz)   LMP 08/13/2024   SpO2 99%   BMI 31.17 kg/m²     Physical Exam  Vitals and nursing note reviewed.   Constitutional:       General: She is not in acute distress.     Appearance: Normal appearance. She is well-developed. She is not ill-appearing.   HENT:      Head: Normocephalic and atraumatic.      Right Ear: Tympanic membrane normal.      Left Ear: Tympanic membrane normal.      Nose: Nose normal.      Mouth/Throat:      Mouth: Mucous membranes are moist.      Pharynx: Oropharynx is clear. No posterior oropharyngeal erythema.   Eyes:      Pupils: Pupils are equal, round, and reactive to light.   Neck:      Vascular: No carotid bruit.   Cardiovascular:      Rate and Rhythm: Normal rate and regular rhythm.      Pulses: Normal pulses.      Heart sounds: No murmur heard.  Pulmonary:      Effort: Pulmonary effort is normal. No respiratory distress.      Breath sounds: Normal breath sounds.   Abdominal:      General: Bowel sounds are normal. There is no distension.      Palpations: Abdomen is soft.      Tenderness: There is no " abdominal tenderness.   Musculoskeletal:         General: No swelling.      Cervical back: Normal range of motion.      Right lower leg: No edema.      Left lower leg: No edema.   Lymphadenopathy:      Cervical: No cervical adenopathy.   Skin:     General: Skin is warm and dry.      Comments: Red pruritic lesions and inflammation along bilateral forearms, consistent with eczema flareup.  Also present on bilateral ankles.   Neurological:      General: No focal deficit present.      Mental Status: She is alert and oriented to person, place, and time. Mental status is at baseline.   Psychiatric:         Mood and Affect: Mood normal.         Behavior: Behavior normal.         Cognition and Memory: Cognition normal.         Judgment: Judgment normal.           Laura Sloan PA-C  [facility]

## 2024-08-17 DIAGNOSIS — J45.20 MILD INTERMITTENT ASTHMA WITHOUT COMPLICATION: ICD-10-CM

## 2024-08-17 LAB
C TRACH DNA SPEC QL NAA+PROBE: NEGATIVE
N GONORRHOEA DNA SPEC QL NAA+PROBE: NEGATIVE

## 2024-08-17 NOTE — ASSESSMENT & PLAN NOTE
Continue albuterol and Singulair.  Symptoms are stable at this time.  Recommend allergy testing to determine where eczema, asthma and her allergy symptoms are related to.

## 2024-08-17 NOTE — ASSESSMENT & PLAN NOTE
Currently undergoing physical therapy for her flatfeet and lower extremity concerns.  She states she is doing very well with physical therapy and is very happy with her progress there.

## 2024-08-17 NOTE — ASSESSMENT & PLAN NOTE
Patient presents today for an annual physical. Patient's medical history and medication list were reviewed and updated. Annual physical questionnaire was given to review current diet, exercise level, sleep health, dental health, visual health, hearing status, and advanced care planning status.    Preventative health needs were reviewed and assessed today:   Immunizations:   Flu -out of season  COVID -no boosters  Tdap -up-to-date    HIV and hepatitis C screening to be completed with lab work.  I have placed an order for screening lab work several months ago, she is going to get this lab work completed today and I will reach out to her with results.    Due for updated Pap smear.  I believe she should also talk about possible birth control options to help with hormonal regulation prior to her menstrual cycle every month.  She is going to call for an appointment as soon as possible.    Patient otherwise has healthy habits, eats a healthy diet and exercises adequately.  Her physical examination today was unremarkable besides eczema flareup on bilateral arms.  Her vital signs are stable.    Diet/Exercise:  Recommend at least 150 minutes of moderate to vigorous cardiovascular exercise such as running, walking, biking, or swimming. Proper exercise should also include strength training 1-2 days/week to ensure good bone health and muscle tone.   Recommend balanced meals with attention to protein, fat and carbohydrate intake. Recommend decreasing sugars and saturated fats in the diet, and replacing these foods with healthier fruits and vegetables. Recommend whole grains and low-fat milk to ensure proper vitamin D and calcium intake. Avoid added sugars and excess sodium. Pay attention to sugary drinks that may increase total calorie intake in a day, such as iced coffee, iced tea, and sodas. Recommended use of MyPlate.gov to determine proper daily values of macronutrients based on patient's height, weight and age.     Follow-up  in about 6 months for regular check.  Sooner with any acute concerns.

## 2024-08-17 NOTE — ASSESSMENT & PLAN NOTE
Currently having an eczema flareup on bilateral arms and ankles.  Recommend steroid taper as we have completed in the past.  Last taper was several months ago.  I have highly recommended she get in with dermatology and have given her names of dermatologist in the area to get in with as soon as possible.

## 2024-08-17 NOTE — ASSESSMENT & PLAN NOTE
Anxiety is stable at this time.  She takes hydroxyzine as needed.  She would still like to avoid taking a daily medication.  Patient is tearful when we were talking about her anxiety.  I have recommended she get back in with her therapist as a maintenance for her anxiety, which she is definitely going to look into.  She understands that we are here to help and she may come to me at anytime with any changes in her anxiety or if she reconsiders daily treatment in the future.

## 2024-08-18 RX ORDER — ALBUTEROL SULFATE 90 UG/1
2 AEROSOL, METERED RESPIRATORY (INHALATION) EVERY 6 HOURS PRN
Qty: 18 G | Refills: 0 | Status: SHIPPED | OUTPATIENT
Start: 2024-08-18

## 2024-09-12 ENCOUNTER — OFFICE VISIT (OUTPATIENT)
Dept: PHYSICAL THERAPY | Facility: OTHER | Age: 29
End: 2024-09-12
Payer: COMMERCIAL

## 2024-09-12 DIAGNOSIS — M21.41 FLAT FEET: ICD-10-CM

## 2024-09-12 DIAGNOSIS — M76.822 POSTERIOR TIBIALIS TENDINITIS OF BOTH LOWER EXTREMITIES: Primary | ICD-10-CM

## 2024-09-12 DIAGNOSIS — M21.42 FLAT FEET: ICD-10-CM

## 2024-09-12 DIAGNOSIS — M76.821 POSTERIOR TIBIALIS TENDINITIS OF BOTH LOWER EXTREMITIES: Primary | ICD-10-CM

## 2024-09-12 PROCEDURE — 97110 THERAPEUTIC EXERCISES: CPT

## 2024-09-12 NOTE — PROGRESS NOTES
"Daily Note     Today's date: 2024  Patient name: Adrianna Becerra  : 1995  MRN: [id number]  Referring provider: Dieter Ralph DPM  Dx:   Encounter Diagnosis     ICD-10-CM    1. Posterior tibialis tendinitis of both lower extremities  M76.821     M76.822       2. Flat feet  M21.41     M21.42                 Start Time: 1045  Stop Time: 1145  Total time in clinic (min): 60 minutes    Subjective: Pt reports her medial ankle pain has improved; able to walk, stand for long periods and jog.      Objective: See treatment diary below      Assessment: Pt reports that they have made significant improvements in B foot / ankle pain. Upon retesting, pt demonstrated improvements ankle / foot ROM, strength. The patient has achieved the goals established in the POC and will be discharged with a home exercise program.        Plan: Continue per plan of care.      Precautions: none    POC expires Unit limit Auth Expiration date PT/OT + Visit Limit?   24 4 N/a N/a                           Visit/Unit Tracking  AUTH Status:  Date 6/10 6/26 7/3 7/10 7/24 7/31 8/7 8/14 9/12      Yes Used 1 2 3 4 5 6 7 8 9       Remaining                      Visit #: 1 IE 2 3 4 5 6 7 8 9   Date: 6/10 6/26 7/3 7/10 7/24 7/31 8/7 8/14 9/12   Manuals            EPAT: plantar fasciitis 1.4 bars, 21 Hz, 2000 pulses:L     SFP 2.0 bars, 21 Hz, 3000 pls:B    SFP 2.5 bars, 21 Hz, 3000 pls:B     SFP 3.0 bars, 21 Hz, 2000 pls:L    SFP 3.0 bars, 18 Hz, 2000 pls:L    SFP 3.0 bars, 18 Hz, 2000 pls:L    SFP 2.5 bars, 21 Hz, 4000 pls: B plantar fasc & post tib    SFP 2.5 bars, 21 Hz, 4000 pls: B plantar fasc & post tib    SFP                Subtalar med/lat glide SFP           TCJ  post glide SFP     gV TCJ distraction gV TCJ distraction     GT distract + ext SFP                         Neuro Re-Ed             Short foot HEP           Toe yoga HEP           S/L Inversion  2# 3x15   Isos 4x45\" Isos 5x40\" Isos 5x40\"     Eccentric calf raise  Leg press 50# " "3x10-15 Standing 2:1 3x10 2:1 2x15ea  BOOST 75% BW     SL HR 3x10ea BOOST 80% 2:1  X30 ea SL OG 2x15ea    Post tib calf raise  Grn 3x15 Grn x30ea Yellow x30 Yellow 3x15   Yellow 3x15    TB inv/ev   GTB x30ea BTB Inv 3x12ea MTB Inv / Ev 3x15       GT flex TB resist   GTB x30ea OTB x30ea        Lat 4 toe flex TB resist                           SLS  3x30\"   Airex 3x30\" UDKB 3x30\"                  Ther Ex             Bike             Towel stretch  HEP       3x30\"ea 3x30\"ea   Calf S on slant board  X1'ea, ke/kf X1'ea ke/kf X1'ea kf/ke X1'ea kf/ke X1'ea 2X1'ea kf/ke   3x1' ea   Passive toe flex/ext HEP   3x30\"                      Band ankle posteior glide mob      X2\" ea   X2 min                Tank push / pull     Lvl2 8x20'                                    Ther Activity                                         Gait Training              BOOST TM      65% BW, M, 45:45\" WJWJ x5 70% BW, M, 45:45\" WJWJ x5 80% BW, M, 60:90 WJWJ x5    1.0 miles total 80% BW, M, 60:90 WJWJ x5    1.0 miles total 85% BW M, x3 min walk, x7 min jog                 Modalities                                                      "

## 2024-09-15 PROBLEM — Z00.00 ADULT GENERAL MEDICAL EXAMINATION: Status: RESOLVED | Noted: 2024-08-16 | Resolved: 2024-09-15

## 2024-09-26 DIAGNOSIS — J45.20 MILD INTERMITTENT ASTHMA WITHOUT COMPLICATION: ICD-10-CM

## 2024-09-26 RX ORDER — ALBUTEROL SULFATE 90 UG/1
INHALANT RESPIRATORY (INHALATION)
Qty: 18 G | Refills: 2 | Status: SHIPPED | OUTPATIENT
Start: 2024-09-26

## 2024-10-03 ENCOUNTER — TELEPHONE (OUTPATIENT)
Age: 29
End: 2024-10-03

## 2024-10-10 DIAGNOSIS — J30.9 ALLERGIC RHINITIS: ICD-10-CM

## 2024-10-10 DIAGNOSIS — F41.9 ANXIETY: ICD-10-CM

## 2024-10-10 DIAGNOSIS — L30.9 ECZEMA, UNSPECIFIED TYPE: ICD-10-CM

## 2024-10-11 RX ORDER — TRIAMCINOLONE ACETONIDE 1 MG/G
CREAM TOPICAL 2 TIMES DAILY
Qty: 45 G | Refills: 0 | Status: SHIPPED | OUTPATIENT
Start: 2024-10-11

## 2024-10-11 RX ORDER — HYDROXYZINE HYDROCHLORIDE 25 MG/1
25 TABLET, FILM COATED ORAL EVERY 6 HOURS PRN
Qty: 360 TABLET | Refills: 0 | OUTPATIENT
Start: 2024-10-11

## 2024-10-11 RX ORDER — FLUTICASONE PROPIONATE 50 MCG
2 SPRAY, SUSPENSION (ML) NASAL DAILY
Qty: 48 ML | Refills: 0 | OUTPATIENT
Start: 2024-10-11

## 2024-10-17 DIAGNOSIS — J30.9 ALLERGIC RHINITIS: ICD-10-CM

## 2024-10-17 DIAGNOSIS — F41.9 ANXIETY: ICD-10-CM

## 2024-10-18 RX ORDER — HYDROXYZINE HYDROCHLORIDE 25 MG/1
25 TABLET, FILM COATED ORAL EVERY 6 HOURS PRN
Qty: 360 TABLET | Refills: 0 | OUTPATIENT
Start: 2024-10-18

## 2024-10-18 RX ORDER — FLUTICASONE PROPIONATE 50 MCG
2 SPRAY, SUSPENSION (ML) NASAL DAILY
Qty: 48 ML | Refills: 0 | OUTPATIENT
Start: 2024-10-18

## 2024-10-22 ENCOUNTER — OFFICE VISIT (OUTPATIENT)
Dept: FAMILY MEDICINE CLINIC | Facility: CLINIC | Age: 29
End: 2024-10-22
Payer: COMMERCIAL

## 2024-10-22 ENCOUNTER — APPOINTMENT (OUTPATIENT)
Dept: RADIOLOGY | Facility: CLINIC | Age: 29
End: 2024-10-22
Payer: COMMERCIAL

## 2024-10-22 VITALS
HEIGHT: 62 IN | OXYGEN SATURATION: 98 % | TEMPERATURE: 97.3 F | HEART RATE: 90 BPM | BODY MASS INDEX: 31.47 KG/M2 | SYSTOLIC BLOOD PRESSURE: 120 MMHG | DIASTOLIC BLOOD PRESSURE: 80 MMHG | WEIGHT: 171 LBS

## 2024-10-22 DIAGNOSIS — M25.512 CHRONIC LEFT SHOULDER PAIN: Primary | ICD-10-CM

## 2024-10-22 DIAGNOSIS — G89.29 CHRONIC LEFT SHOULDER PAIN: ICD-10-CM

## 2024-10-22 DIAGNOSIS — M25.512 CHRONIC LEFT SHOULDER PAIN: ICD-10-CM

## 2024-10-22 DIAGNOSIS — F41.9 ANXIETY: ICD-10-CM

## 2024-10-22 DIAGNOSIS — G89.29 CHRONIC LEFT SHOULDER PAIN: Primary | ICD-10-CM

## 2024-10-22 DIAGNOSIS — J30.9 ALLERGIC RHINITIS: ICD-10-CM

## 2024-10-22 PROCEDURE — 73030 X-RAY EXAM OF SHOULDER: CPT

## 2024-10-22 PROCEDURE — 99213 OFFICE O/P EST LOW 20 MIN: CPT

## 2024-10-22 RX ORDER — FLUTICASONE PROPIONATE 50 MCG
2 SPRAY, SUSPENSION (ML) NASAL DAILY
Qty: 48 ML | Refills: 1 | Status: SHIPPED | OUTPATIENT
Start: 2024-10-22

## 2024-10-22 RX ORDER — HYDROXYZINE HYDROCHLORIDE 25 MG/1
25 TABLET, FILM COATED ORAL EVERY 6 HOURS PRN
Qty: 360 TABLET | Refills: 1 | Status: SHIPPED | OUTPATIENT
Start: 2024-10-22

## 2024-10-22 NOTE — PROGRESS NOTES
Ambulatory Visit  Name: Adrianna Becerra      : 1995      MRN: [id number]  Encounter Provider: Laura Sloan PA-C  Encounter Date: 10/22/2024   Encounter department: [facility]    Assessment & Plan  Chronic left shoulder pain  Patient with left shoulder pain for over a year, recently worse in the past few weeks.  Likely tendinitis versus muscular strain in nature however will evaluate further with a left shoulder x-ray to rule out arthritic cause.  Patient agreeable with obtaining x-ray.  Will likely need to refer to physical therapy for evaluation and treatment, but may also consider orthopedics depending on x-ray results.  Discussed she can use meloxicam as needed for severe pain however should otherwise use Tylenol to avoid NSAID overuse.  Patient agreeable with plan today and I will be in touch with x-ray results.  Orders:    XR shoulder 2+ vw left; Future    Anxiety    Orders:    hydrOXYzine HCL (ATARAX) 25 mg tablet; Take 1 tablet (25 mg total) by mouth every 6 (six) hours as needed for anxiety    Allergic rhinitis    Orders:    fluticasone (FLONASE) 50 mcg/act nasal spray; 2 sprays into each nostril daily       History of Present Illness     Patient presents today for evaluation of left shoulder pain which has been going on for the past year however she had a flareup a few weeks ago.  She is a  and often times is carrying heavy plates with the left arm.  She always uses a heating pad for the pain and will roll out her back and shoulders as well.  She also uses a massage gun.  She is taking going to a chiropractor that has been recommended by one of her friends.  She has occasionally taken a meloxicam for pain which does help.  She also will take Tylenol every now and then for pain.  She is also using a topical CBD/THC oil.          Review of Systems   Respiratory:  Negative for cough and chest tightness.    Cardiovascular:  Negative for chest pain and palpitations.  "  Musculoskeletal:  Positive for arthralgias (Left shoulder pain). Negative for joint swelling, neck pain and neck stiffness.           Objective     /80   Pulse 90   Temp (!) 97.3 °F (36.3 °C)   Ht 5' 2\" (1.575 m)   Wt 77.6 kg (171 lb)   LMP 10/15/2024   SpO2 98%   BMI 31.28 kg/m²     Physical Exam  Vitals and nursing note reviewed.   Constitutional:       General: She is not in acute distress.     Appearance: Normal appearance. She is not ill-appearing.   HENT:      Head: Normocephalic and atraumatic.   Pulmonary:      Effort: Pulmonary effort is normal. No respiratory distress.   Musculoskeletal:      Right shoulder: Normal.      Left shoulder: Normal. No swelling, effusion, tenderness or crepitus. Normal range of motion. Normal strength. Normal pulse.        Arms:       Comments: No deformity of left shoulder.  Patient retains normal range of motion with no deficits.  No swelling of left shoulder.  No clicking or effusion of left shoulder.  Eczema flareup of bilateral shoulders.    Reported location of pain is marked above.   Skin:     Coloration: Skin is not cyanotic or pale.   Neurological:      General: No focal deficit present.      Mental Status: She is alert and oriented to person, place, and time.   Psychiatric:         Mood and Affect: Mood normal.         Behavior: Behavior normal.         Judgment: Judgment normal.         "

## 2024-10-22 NOTE — ASSESSMENT & PLAN NOTE
Orders:    hydrOXYzine HCL (ATARAX) 25 mg tablet; Take 1 tablet (25 mg total) by mouth every 6 (six) hours as needed for anxiety

## 2024-10-24 ENCOUNTER — TELEPHONE (OUTPATIENT)
Age: 29
End: 2024-10-24

## 2024-10-24 DIAGNOSIS — M25.512 CHRONIC LEFT SHOULDER PAIN: Primary | ICD-10-CM

## 2024-10-24 DIAGNOSIS — G89.29 CHRONIC LEFT SHOULDER PAIN: Primary | ICD-10-CM

## 2024-10-24 NOTE — TELEPHONE ENCOUNTER
Spoke with patient and let her know she can access the letter via [employer]. She may also send a screenshot from her [email or web address].Patient will call her place of work and see how they would like to receive letter then call us back.

## 2024-10-24 NOTE — TELEPHONE ENCOUNTER
Patient called and stated that her shoulder is really bother ing her and she will need a letter for work today.  She would like to see if the PCP can take a look at the xray and let her know what the results are.  [facility] advise

## 2024-11-22 ENCOUNTER — EVALUATION (OUTPATIENT)
Dept: PHYSICAL THERAPY | Facility: OTHER | Age: 29
End: 2024-11-22
Payer: COMMERCIAL

## 2024-11-22 DIAGNOSIS — G89.29 CHRONIC LEFT SHOULDER PAIN: ICD-10-CM

## 2024-11-22 DIAGNOSIS — M25.512 CHRONIC LEFT SHOULDER PAIN: ICD-10-CM

## 2024-11-22 PROCEDURE — 97112 NEUROMUSCULAR REEDUCATION: CPT

## 2024-11-22 PROCEDURE — 97161 PT EVAL LOW COMPLEX 20 MIN: CPT

## 2024-11-22 NOTE — PROGRESS NOTES
PT Evaluation     Today's date: 2024  Patient name: Adrianna Becerra  : 1995  MRN: [id number]  Referring provider: Laura Sloan PA-C  Dx:   Encounter Diagnosis     ICD-10-CM    1. Chronic left shoulder pain  M25.512 Ambulatory Referral to Physical Therapy    G89.29           Start Time: 1147  Stop Time: 1220  Total time in clinic (min): 33 minutes    Assessment  Impairments: abnormal or restricted ROM, activity intolerance, impaired physical strength, lacks appropriate home exercise program, pain with function, poor posture  and poor body mechanics    Assessment details: Adrianna Becerra is a 29 y.o. female presenting to OPPT initial evaluation with chief complaint of acute on chronic L shoulder for the last year. Notes she picked something up and worked through the pain. Pain is described as anterior shoulder pain with occasional tension in L pectoralis and n/t along LUE down to middle finger. Today sx are primarily in L elbow. Functional limitations include difficulty to carry trays as a , overhead ADLs and exercise participation. No previous treatment for primary complaint.    Presents with soft tissue restrictions in L subscapularis / periscapular musculature, impaired neurodynamic mobility of L median nerve, and decreased ER / LT strength impairing overhead carrying in order to perform job as a . Pt given HEP focused on median nerve mobilization and RTC / scapular strengthening. The patient is a good candidate for physical therapy to achieve the following goals.        Goals  STG (3 weeks):  Pt will decrease pain to <2/10 in order to facilitate return to work.  Pt will demonstrate (-) ULTT median nerve to symmetrical  Pt will demonstrate increased ER / low trap MMT 4+/5 in order to improve strength for overhead carrying  Pt's self-perceived disability will decrease as demonstrated by a >5-point improvement in FOTO score.    Pt will be independent with HEP as demonstrated by return demo with  "proper technique and execution of exercises provided.     LTG (6 weeks):  Pt will have absence of pain for 2 consecutive sessions in order to facilitate return to work.  Pt will demonstrate increased ER / low trap MMT 5/5 in order to improve strength for overhead carrying  Pt's self-perceived disability will decrease as demonstrated by a FOTO score >80.  Pt will be independent with progressions to HEP as demonstrated by return demo with proper technique and execution of exercises provided.      Plan  Patient would benefit from: skilled physical therapy and PT eval  Planned modality interventions: biofeedback, electrical stimulation/Russian stimulation, TENS, thermotherapy: hydrocollator packs, traction, ultrasound and cryotherapy    Planned therapy interventions: abdominal trunk stabilization, activity modification, balance, balance/weight bearing training, body mechanics training, flexibility, functional ROM exercises, graded activity, graded exercise, graded motor, gait training, home exercise program, therapeutic training, therapeutic activities, therapeutic exercise, stretching, strengthening, joint mobilization, manual therapy, massage, neuromuscular re-education, patient education, postural training, IASTM and kinesiology taping    Frequency: 1x week  Plan of Care beginning date: 11/22/2024  Plan of Care expiration date: 1/21/2025  Treatment plan discussed with: patient        Subjective Evaluation    History of Present Illness  Mechanism of injury: The following HPI captured from referring provider's encounter and EMR review and confirmed with patient  \"Patient presents today for evaluation of left shoulder pain which has been going on for the past year however she had a flareup a few weeks ago.  She is a  and often times is carrying heavy plates with the left arm.  She always uses a heating pad for the pain and will roll out her back and shoulders as well.  She also uses a massage gun.  She is taking " "going to a chiropractor that has been recommended by one of her friends.  She has occasionally taken a meloxicam for pain which does help.  She also will take Tylenol every now and then for pain.  She is also using a topical CBD/THC oil.\"    Pt presents today with current symptomatic complaints including -   Adrianna Becerra is a 29 y.o. female presenting to OPPT initial evaluation with chief complaint of acute on chronic L shoulder for the last year. Notes she picked something up and worked through the pain. Pain is described as anterior shoulder pain with occasional tension in L pectoralis and n/t along LUE down to middle finger. Today sx are primarily in L elbow. Functional limitations include difficulty to carry trays as a , overhead ADLs and exercise participation. No previous treatment for primary complaint.      X-ray:    \"IMPRESSION:  Apparent mild separation of the acromioclavicular joint about half bone diameter elevation of the distal clavicle in relation to the acromial process.  No fractures.  Normal glenohumeral joint\"            Patient Goals  Patient goals for therapy: decreased pain, increased motion, increased strength, independence with ADLs/IADLs and return to work    Pain  Current pain rating: 3  At best pain ratin  At worst pain ratin  Quality: dull ache    Social Support    Employment status: working  Hand dominance: right      Diagnostic Tests  X-ray: abnormal  Treatments  No previous or current treatments        Objective    Palpation: Tenderness and increased tone in L subscapularis; periscapular tenderness    Range of Motion:     B shoulder ROM full & symmetrical in all directions    Strength:     Left Right   Flexion 4+/5 5/5   Abduction 4+/5 5/5   ER 4/5 5/5   ER @ 90 4/5 5/5   IR 5/5 5/5   Serratus anterior 5/5 5/5   Low traps 4/5 4+/5   Mid traps 4+/5 4+/5   Latissimus dorsi 4/5 4+/5     Clinical tests:  (-) Painful arc  (-) Drop arm  (-) Silvestre  (-) Americo's  (-) " Milli's    (+) ULTT median n.  (-) ULTT ulnar n.          POC expires Unit limit Auth Expiration date PT/OT + Visit Limit?   1/21/25 bomn N/a bomn                           Visit/Unit Tracking  AUTH Status:  Date 11/22              no Used 1               Remaining  7                     Precautions: none      Visit # 1 ie            Date: 11/22            Manuals             Subscap MFR SFP            Scapular mobs SFP                                      Neuro Re-Ed             Median nerve glides HEP                         Seated DB ER             IYT on pball                          No money's HEP            No money raises HEP                         TB rows / ext             TB SA press             TB ER to press                          Lying press band activation                          Band pull apart HEP            Swordpulls HEP                                      Ther Ex             Push-up plus                          Upside-down KB overhead press             Upside-down KB 90/90 hold with full body rotation                          Suitcase carries             Front rack carries             Overhead carries             Ther Activity                                       Gait Training                                       Modalities

## 2024-11-25 ENCOUNTER — APPOINTMENT (OUTPATIENT)
Dept: PHYSICAL THERAPY | Facility: OTHER | Age: 29
End: 2024-11-25
Payer: COMMERCIAL

## 2024-11-25 NOTE — PROGRESS NOTES
Assessment complete. Pt awakens to voice and follows simple commands. Speech is clear and patient is oriented to person. No acute distress noted. Sitter at the bedside. Assessment/Plan:    Advised to start PNV if she is not actively preventing pregnancy.  Recommended monthly SBE and annual CBE. ASCCP guidelines reviewed and pap collected today. Pt states she will check with her mom on Gardasil status. The patient declines STI testing at this time. She is aware that condoms are recommended with all sexual contact for STI prevention. Reviewed diet/activity recommendations. Return to the office in one year for routine annual gyn exam or sooner PRN.      Diagnoses and all orders for this visit:    Encounter for gynecological examination (general) (routine) without abnormal findings    Well woman exam  -     Ambulatory Referral to Obstetrics / Gynecology        Subjective:      Patient ID: Adrianna Becerra is a 29 y.o. female.    This new patient presents for routine annual gyn exam.   Menses are light and regular lasting 5 days.   She denies breast concerns, abn discharge, pelvic pain, bowel/bladder dysfunction, depression/anxiety.   Contraception, none.   Sexually active, 1 partner, 6 years, using condoms on occasion. She is aware of pregnancy risk and would welcome a pregnancy.   Last pap, unknown  Gardasil, unknown. She was born in [place] and will check immunization records.              The following portions of the patient's history were reviewed and updated as appropriate: allergies, current medications, past family history, past medical history, past social history, past surgical history and problem list.    Review of Systems   Constitutional: Negative.    HENT: Negative.     Respiratory: Negative.     Cardiovascular: Negative.    Gastrointestinal: Negative.    Endocrine: Negative.    Genitourinary:  Negative for difficulty urinating, dyspareunia, dysuria, frequency, menstrual problem, pelvic pain, urgency, vaginal bleeding, vaginal discharge and vaginal pain.   Musculoskeletal: Negative.    Skin: Negative.    Neurological: Negative.    Psychiatric/Behavioral: Negative.        "    Objective:      /60 (BP Location: Right arm, Patient Position: Sitting, Cuff Size: Standard)   Ht 5' 2\" (1.575 m)   Wt 79.8 kg (176 lb)   LMP 11/05/2024 (Approximate)   BMI 32.19 kg/m²          Physical Exam  Vitals and nursing note reviewed. Exam conducted with a chaperone present.   Constitutional:       Appearance: Normal appearance. She is well-developed.   HENT:      Head: Normocephalic and atraumatic.   Neck:      Thyroid: No thyroid mass or thyromegaly.   Cardiovascular:      Rate and Rhythm: Normal rate and regular rhythm.      Heart sounds: Normal heart sounds.   Pulmonary:      Effort: Pulmonary effort is normal.      Breath sounds: Normal breath sounds.   Chest:   Breasts:     Breasts are symmetrical.      Right: No inverted nipple, mass, nipple discharge, skin change or tenderness.      Left: No inverted nipple, mass, nipple discharge, skin change or tenderness.   Abdominal:      General: Bowel sounds are normal.      Palpations: Abdomen is soft.      Tenderness: There is no abdominal tenderness.      Hernia: There is no hernia in the left inguinal area or right inguinal area.   Genitourinary:     General: Normal vulva.      Exam position: Supine.      Pubic Area: No rash.       Labia:         Right: No rash, tenderness, lesion or injury.         Left: No rash, tenderness, lesion or injury.       Urethra: No prolapse, urethral pain, urethral swelling or urethral lesion.      Vagina: Normal. No signs of injury and foreign body. No vaginal discharge, erythema, tenderness, bleeding, lesions or prolapsed vaginal walls.      Cervix: No cervical motion tenderness, discharge, friability, lesion, erythema, cervical bleeding or eversion.      Uterus: Not deviated, not enlarged, not fixed, not tender and no uterine prolapse.       Adnexa:         Right: No mass, tenderness or fullness.          Left: No mass, tenderness or fullness.        Rectum: No external hemorrhoid.      Comments: Urethra normal " without lesions  No bladder tenderness  Musculoskeletal:         General: Normal range of motion.      Cervical back: Normal range of motion and neck supple.   Lymphadenopathy:      Lower Body: No right inguinal adenopathy. No left inguinal adenopathy.   Skin:     General: Skin is warm and dry.   Neurological:      Mental Status: She is alert and oriented to person, place, and time.   Psychiatric:         Speech: Speech normal.         Behavior: Behavior normal. Behavior is cooperative.

## 2024-11-26 ENCOUNTER — OFFICE VISIT (OUTPATIENT)
Dept: GYNECOLOGY | Facility: CLINIC | Age: 29
End: 2024-11-26
Payer: COMMERCIAL

## 2024-11-26 VITALS
HEIGHT: 62 IN | SYSTOLIC BLOOD PRESSURE: 110 MMHG | WEIGHT: 176 LBS | DIASTOLIC BLOOD PRESSURE: 60 MMHG | BODY MASS INDEX: 32.39 KG/M2

## 2024-11-26 DIAGNOSIS — Z12.4 ENCOUNTER FOR PAPANICOLAOU SMEAR FOR CERVICAL CANCER SCREENING: ICD-10-CM

## 2024-11-26 DIAGNOSIS — Z01.419 ENCOUNTER FOR GYNECOLOGICAL EXAMINATION (GENERAL) (ROUTINE) WITHOUT ABNORMAL FINDINGS: Primary | ICD-10-CM

## 2024-11-26 DIAGNOSIS — Z01.419 WELL WOMAN EXAM: ICD-10-CM

## 2024-11-26 PROCEDURE — S0610 ANNUAL GYNECOLOGICAL EXAMINA: HCPCS | Performed by: OBSTETRICS & GYNECOLOGY

## 2024-11-26 PROCEDURE — G0476 HPV COMBO ASSAY CA SCREEN: HCPCS | Performed by: OBSTETRICS & GYNECOLOGY

## 2024-11-26 PROCEDURE — G0145 SCR C/V CYTO,THINLAYER,RESCR: HCPCS | Performed by: OBSTETRICS & GYNECOLOGY

## 2024-11-27 LAB
HPV HR 12 DNA CVX QL NAA+PROBE: NEGATIVE
HPV16 DNA CVX QL NAA+PROBE: NEGATIVE
HPV18 DNA CVX QL NAA+PROBE: NEGATIVE

## 2024-12-02 ENCOUNTER — OFFICE VISIT (OUTPATIENT)
Dept: PHYSICAL THERAPY | Facility: OTHER | Age: 29
End: 2024-12-02
Payer: COMMERCIAL

## 2024-12-02 DIAGNOSIS — G89.29 CHRONIC LEFT SHOULDER PAIN: Primary | ICD-10-CM

## 2024-12-02 DIAGNOSIS — M25.512 CHRONIC LEFT SHOULDER PAIN: Primary | ICD-10-CM

## 2024-12-02 PROCEDURE — 97110 THERAPEUTIC EXERCISES: CPT

## 2024-12-02 NOTE — PROGRESS NOTES
"Daily Note     Today's date: 2024  Patient name: Adrianna Becerra  : 1995  MRN: [id number]  Referring provider: Dieter Ralph DPM  Dx:   Encounter Diagnosis     ICD-10-CM    1. Chronic left shoulder pain  M25.512     G89.29           Start Time: 1033  Stop Time: 1111  Total time in clinic (min): 38 minutes    Subjective: \"I feel better than last week, I've been showing everybody the nerve glides at work!\"      Objective: See treatment diary below      Assessment: Tolerated treatment well focused on L scapular / RTC strengthening with no complaints other than muscle fatigue; absence of L shoulder p! T/o session. Did note forearm / elbow pain that resolved with STM. Patient demonstrated fatigue post treatment, exhibited good technique with therapeutic exercises, and would benefit from continued PT      Plan: Continue per plan of care.      Precautions: none      Visit # 1 ie 2           Date:  12           Manuals             Subscap MFR SFP SFP           Scapular mobs SFP            FR  L forearm / tri  SFP                        Neuro Re-Ed             Median nerve glides HEP                         Seated DB ER             IYT on pball  0# 2x10ea                        No money's HEP            No money raises HEP                         TB rows / ext             TB SA press             TB ER  GTB 3x12           TB ER to press  OTB 3x8                        Lying press band activation                          Band pull apart HEP            Swordpulls HEP                                      Ther Ex             Push-up plus                          Upside-down KB overhead press  7.5# 3x8           Upside-down KB 90/90 hold with full body rotation  5# 3x10                        Suitcase carries             Front rack carries             Overhead carries             Ther Activity                                       Gait Training                                       Modalities                      "

## 2024-12-03 ENCOUNTER — RESULTS FOLLOW-UP (OUTPATIENT)
Dept: GYNECOLOGY | Facility: CLINIC | Age: 29
End: 2024-12-03

## 2024-12-03 LAB
LAB AP GYN PRIMARY INTERPRETATION: NORMAL
Lab: NORMAL

## 2024-12-04 ENCOUNTER — TELEPHONE (OUTPATIENT)
Age: 29
End: 2024-12-04

## 2024-12-09 ENCOUNTER — APPOINTMENT (OUTPATIENT)
Dept: PHYSICAL THERAPY | Facility: OTHER | Age: 29
End: 2024-12-09
Payer: COMMERCIAL

## 2024-12-09 ENCOUNTER — TELEPHONE (OUTPATIENT)
Age: 29
End: 2024-12-09

## 2024-12-09 NOTE — TELEPHONE ENCOUNTER
Caller: Patient    Doctor/Office: na     Call regarding :  Re-schedule PT    Call was transferred to: PT

## 2024-12-12 ENCOUNTER — APPOINTMENT (OUTPATIENT)
Dept: PHYSICAL THERAPY | Facility: OTHER | Age: 29
End: 2024-12-12
Payer: COMMERCIAL

## 2024-12-12 NOTE — PROGRESS NOTES
"Daily Note     Today's date: 2024  Patient name: Adrianna Becerra  : 1995  MRN: [id number]  Referring provider: Dieter Ralph DPM  Dx:   Encounter Diagnosis     ICD-10-CM    1. Chronic left shoulder pain  M25.512     G89.29                      Subjective: \"I feel better than last week, I've been showing everybody the nerve glides at work!\"      Objective: See treatment diary below      Assessment: Tolerated treatment well focused on L scapular / RTC strengthening with no complaints other than muscle fatigue; absence of L shoulder p! T/o session. Did note forearm / elbow pain that resolved with STM. Patient demonstrated fatigue post treatment, exhibited good technique with therapeutic exercises, and would benefit from continued PT      Plan: Continue per plan of care.      Precautions: none      Visit # 1 ie 2 3          Date:           Manuals             Subscap MFR SFP SFP           Scapular mobs SFP            FR  L forearm / tri  SFP                        Neuro Re-Ed             Median nerve glides HEP                         Seated DB ER             IYT on pball  0# 2x10ea                        No money's HEP            No money raises HEP                         TB rows / ext             TB SA press             TB ER  GTB 3x12           TB ER to press  OTB 3x8                        Lying press band activation                          Band pull apart HEP            Swordpulls HEP                                      Ther Ex             Push-up plus                          Upside-down KB overhead press  7.5# 3x8           Upside-down KB 90/90 hold with full body rotation  5# 3x10                        Suitcase carries             Front rack carries             Overhead carries             Ther Activity                                       Gait Training                                       Modalities                                                 "

## 2024-12-16 ENCOUNTER — OFFICE VISIT (OUTPATIENT)
Dept: PHYSICAL THERAPY | Facility: OTHER | Age: 29
End: 2024-12-16
Payer: COMMERCIAL

## 2024-12-16 DIAGNOSIS — G89.29 CHRONIC LEFT SHOULDER PAIN: Primary | ICD-10-CM

## 2024-12-16 DIAGNOSIS — M25.512 CHRONIC LEFT SHOULDER PAIN: Primary | ICD-10-CM

## 2024-12-16 PROCEDURE — 97140 MANUAL THERAPY 1/> REGIONS: CPT

## 2024-12-16 PROCEDURE — 97112 NEUROMUSCULAR REEDUCATION: CPT

## 2024-12-16 NOTE — PROGRESS NOTES
"Daily Note     Today's date: 2024  Patient name: Adrianna Becerra  : 1995  MRN: [id number]  Referring provider: Dieter Ralph DPM  Dx:   Encounter Diagnosis     ICD-10-CM    1. Chronic left shoulder pain  M25.512     G89.29           Start Time: 1148  Stop Time: 1226  Total time in clinic (min): 38 minutes    Subjective: \"I feel better than last week, I've been showing everybody the nerve glides at work!\"      Objective: See treatment diary below      Assessment: Tolerated treatment well focused on L scapular / RTC strengthening, continues to note lateral L elbow pain on occasion. Noted reduction of elbow pain when cued for increased scap activation / pulling hard against band during banded bench activation in supine. Patient demonstrated fatigue post treatment, exhibited good technique with therapeutic exercises, and would benefit from continued PT      Plan: Continue per plan of care.      Precautions: none      Visit # 1 ie 2 3          Date:           Manuals             Subscap MFR SFP SFP SFP          Scapular mobs SFP            FR  L forearm / tri  SFP L pec major                        Neuro Re-Ed             Median nerve glides HEP                         Seated DB ER             IYT on pball  0# 2x10ea 1# 2x10                       No money's HEP  OTB 20x5\"          No money raises HEP  OTB x10                       TB rows / ext             TB SA press             TB ER  GTB 3x12           TB Facepull   GTB 3x15          TB ER to press  OTB 3x8 LOTB x25                       Lying press band activation   OTB 2x10                       Band pull apart HEP            Swordpulls HEP                                      Ther Ex             Corner pec S   10x10\"ea                       Push-up plus                          Upside-down KB overhead press  7.5# 3x8           Upside-down KB 90/90 hold with full body rotation  5# 3x10                        Suitcase carries           "   Front rack carries             Overhead carries             Ther Activity                                       Gait Training                                       Modalities

## 2024-12-19 ENCOUNTER — OFFICE VISIT (OUTPATIENT)
Dept: FAMILY MEDICINE CLINIC | Facility: CLINIC | Age: 29
End: 2024-12-19
Payer: COMMERCIAL

## 2024-12-19 VITALS
SYSTOLIC BLOOD PRESSURE: 110 MMHG | HEIGHT: 62 IN | OXYGEN SATURATION: 97 % | DIASTOLIC BLOOD PRESSURE: 78 MMHG | BODY MASS INDEX: 32.02 KG/M2 | WEIGHT: 174 LBS | TEMPERATURE: 97.7 F | HEART RATE: 95 BPM

## 2024-12-19 DIAGNOSIS — J01.90 ACUTE SINUSITIS, RECURRENCE NOT SPECIFIED, UNSPECIFIED LOCATION: Primary | ICD-10-CM

## 2024-12-19 LAB
SARS-COV-2 AG UPPER RESP QL IA: NEGATIVE
VALID CONTROL: NORMAL

## 2024-12-19 PROCEDURE — 87811 SARS-COV-2 COVID19 W/OPTIC: CPT | Performed by: NURSE PRACTITIONER

## 2024-12-19 PROCEDURE — 99213 OFFICE O/P EST LOW 20 MIN: CPT | Performed by: NURSE PRACTITIONER

## 2024-12-19 NOTE — PROGRESS NOTES
"Name: Adrianna Becerra      : 1995      MRN: [id number]  Encounter Provider: BERTIN Gil  Encounter Date: 2024   Encounter department: [facility]  :  Assessment & Plan  Acute sinusitis, recurrence not specified, unspecified location  Abx as ordered; advised on supportive care; f/u guidance given should sx not improve    Orders:    Poct Covid 19 Rapid Antigen Test    amoxicillin-clavulanate (AUGMENTIN) 875-125 mg per tablet; Take 1 tablet by mouth every 12 (twelve) hours for 5 days           History of Present Illness     Acute visit  URI symptoms. Started Monday. Reports head, nasal and chest congestion. Cough productive - green. PND. Nasal drainage is clear. Occasional nausea. No vomiting.Chills, but no fever.   Several coworkers sick. History asthma. Using rescue inhaler more frequently - especially at night.  OTC Mucinex, Dayquil, Sudafed      Review of Systems   Constitutional:  Positive for activity change, chills and fatigue. Negative for fever.   HENT:  Positive for congestion, postnasal drip and sinus pain. Negative for sore throat.    Respiratory:  Positive for cough.    Cardiovascular: Negative.    Gastrointestinal: Negative.    Neurological: Negative.    Psychiatric/Behavioral: Negative.         Objective   /78   Pulse 95   Temp 97.7 °F (36.5 °C)   Ht 5' 1.81\" (1.57 m)   Wt 78.9 kg (174 lb)   LMP 2024 (Approximate)   SpO2 97%   BMI 32.02 kg/m²      Physical Exam  Vitals and nursing note reviewed.   Constitutional:       General: She is not in acute distress.     Appearance: Normal appearance. She is well-developed. She is not ill-appearing.   HENT:      Right Ear: Tympanic membrane and ear canal normal.      Left Ear: Tympanic membrane and ear canal normal.      Nose:      Right Turbinates: Swollen.      Left Turbinates: Swollen.      Right Sinus: Maxillary sinus tenderness present.      Left Sinus: Maxillary sinus tenderness present.      " Mouth/Throat:      Mouth: Mucous membranes are moist.      Pharynx: No oropharyngeal exudate or posterior oropharyngeal erythema.      Tonsils: 3+ on the right. 3+ on the left.   Cardiovascular:      Rate and Rhythm: Normal rate and regular rhythm.   Pulmonary:      Effort: Pulmonary effort is normal. No respiratory distress.      Breath sounds: Normal breath sounds and air entry. No wheezing or rhonchi.   Lymphadenopathy:      Cervical: No cervical adenopathy.   Skin:     General: Skin is warm and dry.   Neurological:      General: No focal deficit present.      Mental Status: She is alert and oriented to person, place, and time.   Psychiatric:         Attention and Perception: Attention normal.         Mood and Affect: Mood normal.         Behavior: Behavior normal.

## 2024-12-30 ENCOUNTER — OFFICE VISIT (OUTPATIENT)
Dept: PHYSICAL THERAPY | Facility: OTHER | Age: 29
End: 2024-12-30
Payer: COMMERCIAL

## 2024-12-30 DIAGNOSIS — G89.29 CHRONIC LEFT SHOULDER PAIN: Primary | ICD-10-CM

## 2024-12-30 DIAGNOSIS — M25.512 CHRONIC LEFT SHOULDER PAIN: Primary | ICD-10-CM

## 2024-12-30 PROCEDURE — 97140 MANUAL THERAPY 1/> REGIONS: CPT

## 2024-12-30 PROCEDURE — 97110 THERAPEUTIC EXERCISES: CPT

## 2024-12-30 NOTE — PROGRESS NOTES
"Daily Note     Today's date: 2024  Patient name: Adrianna Becerra  : 1995  MRN: [id number]  Referring provider: Dieter Ralph DPM  Dx:   Encounter Diagnosis     ICD-10-CM    1. Chronic left shoulder pain  M25.512     G89.29           Start Time: 0940  Stop Time: 1018  Total time in clinic (min): 38 minutes    Subjective: \"I've been pretty good, I feel like I tightened up the last two days because I didn't do anything this week.\"      Objective: See treatment diary below    FOTO 24: 67    Assessment: Tolerated treatment well focused on L pec / lat / subscap myofascial restrictions and L scapular / RTC strengthening. Pt noted improvements in pain following pec STM / stretching  - encouraged to continue doing this at home. Patient demonstrated fatigue post treatment, exhibited good technique with therapeutic exercises, and would benefit from continued PT      Plan: Continue per plan of care.  Potential d/c in next 2 visits.     Precautions: none      Visit # 1 ie 2 3 4         Date:          Manuals             Subscap MFR SFP SFP SFP Subscap & Lat    SFP         Scapular mobs SFP            FR  L forearm / tri  SFP L pec major  L pec major                      Neuro Re-Ed             Median nerve glides HEP                         Seated DB ER             IYT on pball  0# 2x10ea 1# 2x10                       No money's HEP  OTB 20x5\"          No money raises HEP  OTB x10                       TB rows / ext             TB SA press    BTB 2x15         TB ER  GTB 3x12           TB Facepull   GTB 3x15          TB ER to press  OTB 3x8 LOTB x25 OTB 3x10         TB scap angels    OTB 2x15         TB pec flye    BTB 3x10                      Lying press band activation   OTB 2x10                       Band pull apart HEP            Swordpulls HEP                                      Ther Ex             Corner pec S   10x10\"ea 10x10\"ea                      Push-up plus                     "      Upside-down KB overhead press  7.5# 3x8  7.5# 3x10         Upside-down KB 90/90 hold with full body rotation  5# 3x10                        Suitcase carries             Front rack carries             Overhead carries             Ther Activity                                       Gait Training                                       Modalities

## 2025-01-06 ENCOUNTER — APPOINTMENT (OUTPATIENT)
Dept: PHYSICAL THERAPY | Facility: OTHER | Age: 30
End: 2025-01-06
Payer: COMMERCIAL

## 2025-01-10 ENCOUNTER — TELEPHONE (OUTPATIENT)
Age: 30
End: 2025-01-10

## 2025-01-10 NOTE — TELEPHONE ENCOUNTER
Received call from Patient. Patient had cancelled their appt on MyChart and needed to reschedule. Rescheduled to 10/23/25 8:00 am Dr. Luis Fernando Savage.     Offered 3/2025 [facility], patient is checking if their insurance is accepted into [place] and may call back to reschedule. Patient also verbalized they will call back to check for cancellations/sooner appt.     Patient verbalized understanding.

## 2025-01-13 ENCOUNTER — APPOINTMENT (OUTPATIENT)
Dept: PHYSICAL THERAPY | Facility: OTHER | Age: 30
End: 2025-01-13
Payer: COMMERCIAL

## 2025-01-20 ENCOUNTER — APPOINTMENT (OUTPATIENT)
Dept: PHYSICAL THERAPY | Facility: OTHER | Age: 30
End: 2025-01-20
Payer: COMMERCIAL

## 2025-01-29 ENCOUNTER — OFFICE VISIT (OUTPATIENT)
Dept: PHYSICAL THERAPY | Facility: OTHER | Age: 30
End: 2025-01-29
Payer: COMMERCIAL

## 2025-01-29 DIAGNOSIS — M25.512 CHRONIC LEFT SHOULDER PAIN: Primary | ICD-10-CM

## 2025-01-29 DIAGNOSIS — G89.29 CHRONIC LEFT SHOULDER PAIN: Primary | ICD-10-CM

## 2025-01-29 PROCEDURE — 97110 THERAPEUTIC EXERCISES: CPT

## 2025-01-29 NOTE — PROGRESS NOTES
"Daily Note     Today's date: 2025  Patient name: Adrianna Becerra  : 1995  MRN: [id number]  Referring provider: Dieter Ralph DPM  Dx:   Encounter Diagnosis     ICD-10-CM    1. Chronic left shoulder pain  M25.512     G89.29           Start Time: 1404  Stop Time: 1442  Total time in clinic (min): 38 minutes    Subjective: Pt returns following 4-week absence - notes she has been inconsistent with HEP but when she does it she feels better.      Objective: See treatment diary below    Full L shld A/PROM in all directions  4/5 L low trap MMT  4+/5 L ER @ 90 MMT  4+/5 L midtrap MMT    Assessment: Tolerated treatment well - given improvements, pt to be discharged with HEP focused on RTC / scapular strengthening and pec stretching. Instructed to reach out if issues arise.   Plan: Continue per plan of care.       Precautions: none      Visit # 1 ie 2 3 4 5        Date:         Manuals             Subscap MFR SFP SFP SFP Subscap & Lat    SFP         Scapular mobs SFP            FR  L forearm / tri  SFP L pec major  L pec major                      Neuro Re-Ed             Median nerve glides HEP                         Seated DB ER             IYT on pball  0# 2x10ea 1# 2x10  2# Ys/ Ts  3x10  HEP                     No money's HEP  OTB 20x5\"  GTB 10X5\" @ 0 & 90 deg    HEP        No money raises HEP  OTB x10  GTB x10    HEP                     TB rows / ext             TB SA press    BTB 2x15         TB ER  GTB 3x12   ER @ 90 walkouts, 2x10 OTB   HEP        TB Facepull   GTB 3x15          TB ER to press  OTB 3x8 LOTB x25 OTB 3x10 OTB 3x10 HEP        TB scap angels    OTB 2x15         TB pec flye    BTB 3x10                      Lying press band activation   OTB 2x10                       Band pull apart HEP            Swordpulls HEP                                      Ther Ex             Corner pec S   10x10\"ea 10x10\"ea @ 60, 90, 120  10x10\"ea  HEP                     Push-up plus           "                Upside-down KB overhead press  7.5# 3x8  7.5# 3x10         Upside-down KB 90/90 hold with full body rotation  5# 3x10                        Suitcase carries             Front rack carries             Overhead carries             Ther Activity                                       Gait Training                                       Modalities

## 2025-02-14 ENCOUNTER — RA CDI HCC (OUTPATIENT)
Dept: OTHER | Facility: HOSPITAL | Age: 30
End: 2025-02-14

## 2025-02-17 ENCOUNTER — OFFICE VISIT (OUTPATIENT)
Dept: FAMILY MEDICINE CLINIC | Facility: CLINIC | Age: 30
End: 2025-02-17
Payer: COMMERCIAL

## 2025-02-17 VITALS
WEIGHT: 174.2 LBS | BODY MASS INDEX: 32.06 KG/M2 | HEIGHT: 62 IN | OXYGEN SATURATION: 99 % | SYSTOLIC BLOOD PRESSURE: 126 MMHG | TEMPERATURE: 98.2 F | DIASTOLIC BLOOD PRESSURE: 86 MMHG | HEART RATE: 94 BPM

## 2025-02-17 DIAGNOSIS — M21.41 FLAT FEET: ICD-10-CM

## 2025-02-17 DIAGNOSIS — M21.42 FLAT FEET: ICD-10-CM

## 2025-02-17 DIAGNOSIS — M25.512 CHRONIC LEFT SHOULDER PAIN: Primary | ICD-10-CM

## 2025-02-17 DIAGNOSIS — J45.20 MILD INTERMITTENT ASTHMA WITHOUT COMPLICATION: ICD-10-CM

## 2025-02-17 DIAGNOSIS — L73.9 FOLLICULITIS: ICD-10-CM

## 2025-02-17 DIAGNOSIS — G89.29 CHRONIC LEFT SHOULDER PAIN: Primary | ICD-10-CM

## 2025-02-17 DIAGNOSIS — L30.9 ECZEMA, UNSPECIFIED TYPE: ICD-10-CM

## 2025-02-17 DIAGNOSIS — F41.9 ANXIETY: ICD-10-CM

## 2025-02-17 PROCEDURE — 99214 OFFICE O/P EST MOD 30 MIN: CPT

## 2025-02-17 RX ORDER — MUPIROCIN 20 MG/G
OINTMENT TOPICAL 3 TIMES DAILY
Qty: 30 G | Refills: 2 | Status: SHIPPED | OUTPATIENT
Start: 2025-02-17

## 2025-02-17 RX ORDER — TRIAMCINOLONE ACETONIDE 1 MG/G
CREAM TOPICAL 2 TIMES DAILY
Qty: 45 G | Refills: 3 | Status: SHIPPED | OUTPATIENT
Start: 2025-02-17

## 2025-02-17 RX ORDER — ALBUTEROL SULFATE 90 UG/1
2 INHALANT RESPIRATORY (INHALATION) EVERY 6 HOURS PRN
Qty: 18 G | Refills: 2 | Status: SHIPPED | OUTPATIENT
Start: 2025-02-17

## 2025-02-17 NOTE — PROGRESS NOTES
Name: Adrianna Becerra      : 1995      MRN: [id number]  Encounter Provider: Laura Sloan PA-C  Encounter Date: 2025   Encounter department: [facility]  :  Assessment & Plan  Chronic left shoulder pain  Recommended being consistent with home exercise program.  Patient has completed several sessions of physical therapy.  She has acupuncture and massage scheduled for the left shoulder, she would like to wait on an orthopedic referral until she sees a massage therapist to see if this helps out with her pain.       Mild intermittent asthma without complication  Uses albuterol as needed for asthma flares, refill provided today.  Orders:  •  albuterol (PROVENTIL HFA,VENTOLIN HFA) 90 mcg/act inhaler; Inhale 2 puffs every 6 (six) hours as needed for wheezing    Anxiety  Stable with as needed use of hydroxyzine.  Does not currently follow with therapist.       Eczema, unspecified type  Not able to get in with dermatology until October.  At this time doing well with Kenalog cream with no recent exacerbations.  Orders:  •  triamcinolone (KENALOG) 0.1 % cream; Apply topically 2 (two) times a day    Folliculitis  Continues to have folliculitis episodes of the lower legs, typically does well with Bactroban ointment, refill provided today.  Orders:  •  mupirocin (BACTROBAN) 2 % ointment; Apply topically 3 (three) times a day    Flat feet  Will reconsult podiatry about flatfeet.  Reports she got inserts provided to her by physical therapy but at this present point is not sure she is wearing the right sneakers/shoes for good support.  Orders:  •  Ambulatory Referral to Podiatry; Future           History of Present Illness   Patient presents today for 6-month follow-up.  Reports continued left shoulder pain but admits to not being consistent with her home exercise program.  Reports also pain in bilateral feet due to her flatfeet.  Reports she did get inserts in her shoes given to her by her  "physical therapist but feels her sneakers may not be the best option for her.  Will reconsult podiatry to see if they have any further recommendations.  Needs refills of several of her medications.      Review of Systems   Constitutional:  Negative for chills, fatigue and fever.   Respiratory:  Negative for cough, chest tightness and shortness of breath.    Cardiovascular:  Negative for chest pain, palpitations and leg swelling.   Musculoskeletal:  Positive for arthralgias (Chronic left shoulder pain) and gait problem.   Neurological:  Negative for dizziness, light-headedness and headaches.       Objective   /86 (BP Location: Left arm, Patient Position: Sitting, Cuff Size: Standard)   Pulse 94   Temp 98.2 °F (36.8 °C) (Temporal)   Ht 5' 1.81\" (1.57 m)   Wt 79 kg (174 lb 3.2 oz)   SpO2 99%   BMI 32.06 kg/m²      Physical Exam  Vitals and nursing note reviewed.   Constitutional:       General: She is not in acute distress.     Appearance: Normal appearance. She is normal weight. She is not ill-appearing.   HENT:      Head: Normocephalic and atraumatic.   Cardiovascular:      Rate and Rhythm: Normal rate and regular rhythm.   Pulmonary:      Effort: Pulmonary effort is normal. No respiratory distress.      Breath sounds: Normal breath sounds.   Musculoskeletal:      Right lower leg: No edema.      Left lower leg: No edema.   Skin:     General: Skin is warm and dry.      Coloration: Skin is not cyanotic or pale.   Neurological:      General: No focal deficit present.      Mental Status: She is alert and oriented to person, place, and time. Mental status is at baseline.   Psychiatric:         Mood and Affect: Mood normal.         Behavior: Behavior normal.         Judgment: Judgment normal.         "

## 2025-02-17 NOTE — ASSESSMENT & PLAN NOTE
Not able to get in with dermatology until October.  At this time doing well with Kenalog cream with no recent exacerbations.  Orders:  •  triamcinolone (KENALOG) 0.1 % cream; Apply topically 2 (two) times a day

## 2025-02-17 NOTE — ASSESSMENT & PLAN NOTE
Uses albuterol as needed for asthma flares, refill provided today.  Orders:  •  albuterol (PROVENTIL HFA,VENTOLIN HFA) 90 mcg/act inhaler; Inhale 2 puffs every 6 (six) hours as needed for wheezing

## 2025-02-17 NOTE — ASSESSMENT & PLAN NOTE
Continues to have folliculitis episodes of the lower legs, typically does well with Bactroban ointment, refill provided today.  Orders:  •  mupirocin (BACTROBAN) 2 % ointment; Apply topically 3 (three) times a day

## 2025-02-17 NOTE — ASSESSMENT & PLAN NOTE
Will reconsult podiatry about flatfeet.  Reports she got inserts provided to her by physical therapy but at this present point is not sure she is wearing the right sneakers/shoes for good support.  Orders:  •  Ambulatory Referral to Podiatry; Future

## 2025-06-09 ENCOUNTER — APPOINTMENT (OUTPATIENT)
Dept: RADIOLOGY | Facility: CLINIC | Age: 30
End: 2025-06-09
Payer: COMMERCIAL

## 2025-06-09 ENCOUNTER — OFFICE VISIT (OUTPATIENT)
Dept: FAMILY MEDICINE CLINIC | Facility: CLINIC | Age: 30
End: 2025-06-09
Payer: COMMERCIAL

## 2025-06-09 VITALS
BODY MASS INDEX: 31.98 KG/M2 | OXYGEN SATURATION: 98 % | HEART RATE: 94 BPM | HEIGHT: 62 IN | SYSTOLIC BLOOD PRESSURE: 126 MMHG | TEMPERATURE: 97.6 F | DIASTOLIC BLOOD PRESSURE: 88 MMHG | WEIGHT: 173.8 LBS

## 2025-06-09 DIAGNOSIS — M79.672 CHRONIC PAIN IN LEFT FOOT: Primary | ICD-10-CM

## 2025-06-09 DIAGNOSIS — M79.672 CHRONIC PAIN IN LEFT FOOT: ICD-10-CM

## 2025-06-09 DIAGNOSIS — M76.821 POSTERIOR TIBIALIS TENDINITIS OF BOTH LOWER EXTREMITIES: ICD-10-CM

## 2025-06-09 DIAGNOSIS — G89.29 CHRONIC PAIN IN LEFT FOOT: Primary | ICD-10-CM

## 2025-06-09 DIAGNOSIS — G89.29 CHRONIC PAIN IN LEFT FOOT: ICD-10-CM

## 2025-06-09 DIAGNOSIS — M76.822 POSTERIOR TIBIALIS TENDINITIS OF BOTH LOWER EXTREMITIES: ICD-10-CM

## 2025-06-09 PROCEDURE — 73630 X-RAY EXAM OF FOOT: CPT

## 2025-06-09 PROCEDURE — 99213 OFFICE O/P EST LOW 20 MIN: CPT

## 2025-06-09 RX ORDER — MELOXICAM 15 MG/1
15 TABLET ORAL DAILY
Qty: 30 TABLET | Refills: 1 | Status: SHIPPED | OUTPATIENT
Start: 2025-06-09 | End: 2025-08-08

## 2025-06-09 NOTE — PROGRESS NOTES
Name: Adrianna Becerra      : 1995      MRN: [id number]  Encounter Provider: Laura Sloan PA-C  Encounter Date: 2025   Encounter department: [facility]  :  Assessment & Plan  Chronic pain in left foot  Patient does have bony prominence along left medial side of left foot today on examination, unclear if this has been present in the past.  At this time etiology is unknown however will send patient for left foot x-ray for further evaluation.  May need to consult podiatry again for more in-depth evaluation and further imaging if needed.  Will trial meloxicam in the meantime to help with pain as this has been helpful for her in the past.  Advised against use of NSAIDs while on meloxicam, patient agreeable with plan today, continue supportive care at home as well along with bracing the ankle to help with stability.  Patient agreeable with plan today, will call with any issues.  I will be in touch with x-ray results.  Orders:  •  XR foot 3+ vw left; Future    Posterior tibialis tendinitis of both lower extremities    Orders:  •  meloxicam (Mobic) 15 mg tablet; Take 1 tablet (15 mg total) by mouth in the morning. prn.           History of Present Illness   Patient presents today to discuss persistent left ankle pain which has been present for over 1 year.  She reports pain is still persistent despite trial of physical therapy and evaluation by podiatry.  She reports never having imaging of the left foot/ankle and would like to consider imaging at this point.  She is not currently taking anything for pain but has tried meloxicam in the past which was helpful.  Reports pain at times can be a burning sensation and typically worsens with standing for long periods of time.      Review of Systems   Musculoskeletal:  Positive for arthralgias and gait problem.       Objective   /88 (BP Location: Left arm, Patient Position: Sitting, Cuff Size: Adult)   Pulse 94   Temp 97.6 °F (36.4 °C)  "(Temporal)   Ht 5' 1.8\" (1.57 m)   Wt 78.8 kg (173 lb 12.8 oz)   SpO2 98%   BMI 31.99 kg/m²      Physical Exam  Vitals and nursing note reviewed.   Constitutional:       General: She is not in acute distress.     Appearance: Normal appearance. She is not ill-appearing.   HENT:      Head: Normocephalic and atraumatic.   Pulmonary:      Effort: Pulmonary effort is normal. No respiratory distress.     Musculoskeletal:        Feet:    Feet:      Comments: Unknown etiology of left medial bony prominence of foot.  No pain with light palpation, slight pain with deep palpation.  Pulses within normal limits.    Skin:     Coloration: Skin is not cyanotic or pale.     Neurological:      General: No focal deficit present.      Mental Status: She is alert and oriented to person, place, and time.     Psychiatric:         Mood and Affect: Mood normal.         Behavior: Behavior normal.         Judgment: Judgment normal.         "

## 2025-06-10 ENCOUNTER — RESULTS FOLLOW-UP (OUTPATIENT)
Dept: FAMILY MEDICINE CLINIC | Facility: CLINIC | Age: 30
End: 2025-06-10

## 2025-07-30 ENCOUNTER — OFFICE VISIT (OUTPATIENT)
Dept: PODIATRY | Facility: CLINIC | Age: 30
End: 2025-07-30
Payer: COMMERCIAL

## 2025-07-30 VITALS — BODY MASS INDEX: 31.85 KG/M2 | WEIGHT: 173 LBS

## 2025-07-30 DIAGNOSIS — M76.821 POSTERIOR TIBIALIS TENDINITIS OF BOTH LOWER EXTREMITIES: Primary | ICD-10-CM

## 2025-07-30 DIAGNOSIS — M21.41 FLAT FEET: ICD-10-CM

## 2025-07-30 DIAGNOSIS — M21.42 FLAT FEET: ICD-10-CM

## 2025-07-30 DIAGNOSIS — M72.2 PLANTAR FASCIITIS: ICD-10-CM

## 2025-07-30 DIAGNOSIS — M76.822 POSTERIOR TIBIALIS TENDINITIS OF BOTH LOWER EXTREMITIES: Primary | ICD-10-CM

## 2025-07-30 PROCEDURE — 99214 OFFICE O/P EST MOD 30 MIN: CPT | Performed by: PODIATRIST

## 2025-07-30 PROCEDURE — 20550 NJX 1 TENDON SHEATH/LIGAMENT: CPT | Performed by: PODIATRIST

## 2025-07-30 RX ORDER — MELOXICAM 15 MG/1
15 TABLET ORAL DAILY
Qty: 90 TABLET | Refills: 0 | Status: SHIPPED | OUTPATIENT
Start: 2025-07-30 | End: 2025-10-28

## 2025-07-30 RX ORDER — TRIAMCINOLONE ACETONIDE 40 MG/ML
20 INJECTION, SUSPENSION INTRA-ARTICULAR; INTRAMUSCULAR
Status: SHIPPED | OUTPATIENT
Start: 2025-07-30

## 2025-07-30 RX ORDER — BUPIVACAINE HYDROCHLORIDE 2.5 MG/ML
1 INJECTION, SOLUTION INFILTRATION; PERINEURAL
Status: SHIPPED | OUTPATIENT
Start: 2025-07-30

## 2025-07-30 RX ORDER — LIDOCAINE HYDROCHLORIDE 10 MG/ML
1 INJECTION, SOLUTION INFILTRATION; PERINEURAL
Status: SHIPPED | OUTPATIENT
Start: 2025-07-30

## 2025-07-30 RX ADMIN — LIDOCAINE HYDROCHLORIDE 1 ML: 10 INJECTION, SOLUTION INFILTRATION; PERINEURAL at 16:45

## 2025-07-30 RX ADMIN — BUPIVACAINE HYDROCHLORIDE 1 ML: 2.5 INJECTION, SOLUTION INFILTRATION; PERINEURAL at 16:45

## 2025-07-30 RX ADMIN — TRIAMCINOLONE ACETONIDE 20 MG: 40 INJECTION, SUSPENSION INTRA-ARTICULAR; INTRAMUSCULAR at 16:45

## 2025-08-18 ENCOUNTER — OFFICE VISIT (OUTPATIENT)
Dept: FAMILY MEDICINE CLINIC | Facility: CLINIC | Age: 30
End: 2025-08-18
Payer: COMMERCIAL

## 2025-08-18 VITALS
DIASTOLIC BLOOD PRESSURE: 78 MMHG | HEART RATE: 73 BPM | HEIGHT: 63 IN | SYSTOLIC BLOOD PRESSURE: 116 MMHG | WEIGHT: 180 LBS | TEMPERATURE: 97.9 F | BODY MASS INDEX: 31.89 KG/M2 | OXYGEN SATURATION: 97 %

## 2025-08-18 DIAGNOSIS — F41.9 ANXIETY: ICD-10-CM

## 2025-08-18 DIAGNOSIS — L30.9 ECZEMA, UNSPECIFIED TYPE: ICD-10-CM

## 2025-08-18 DIAGNOSIS — L60.0 INGROWN TOENAIL OF RIGHT FOOT: ICD-10-CM

## 2025-08-18 DIAGNOSIS — Z00.00 ADULT GENERAL MEDICAL EXAMINATION: Primary | ICD-10-CM

## 2025-08-18 DIAGNOSIS — J45.21 MILD INTERMITTENT ASTHMA WITH ACUTE EXACERBATION: ICD-10-CM

## 2025-08-18 PROBLEM — L30.1 DYSHIDROTIC ECZEMA: Status: RESOLVED | Noted: 2017-08-23 | Resolved: 2025-08-18

## 2025-08-18 PROCEDURE — 99213 OFFICE O/P EST LOW 20 MIN: CPT

## 2025-08-18 PROCEDURE — 99395 PREV VISIT EST AGE 18-39: CPT

## 2025-08-18 RX ORDER — CEPHALEXIN 500 MG/1
500 CAPSULE ORAL 2 TIMES DAILY
Qty: 14 CAPSULE | Refills: 0 | Status: SHIPPED | OUTPATIENT
Start: 2025-08-18 | End: 2025-08-25